# Patient Record
Sex: MALE | Race: WHITE | NOT HISPANIC OR LATINO | URBAN - METROPOLITAN AREA
[De-identification: names, ages, dates, MRNs, and addresses within clinical notes are randomized per-mention and may not be internally consistent; named-entity substitution may affect disease eponyms.]

---

## 2017-04-15 ENCOUNTER — EMERGENCY (EMERGENCY)
Facility: HOSPITAL | Age: 72
LOS: 1 days | Discharge: HOME | End: 2017-04-15
Attending: EMERGENCY MEDICINE | Admitting: EMERGENCY MEDICINE
Payer: MEDICARE

## 2017-04-15 VITALS
SYSTOLIC BLOOD PRESSURE: 147 MMHG | HEIGHT: 72 IN | HEART RATE: 59 BPM | OXYGEN SATURATION: 99 % | TEMPERATURE: 98 F | WEIGHT: 229.06 LBS | RESPIRATION RATE: 20 BRPM | DIASTOLIC BLOOD PRESSURE: 83 MMHG

## 2017-04-15 DIAGNOSIS — R31.9 HEMATURIA, UNSPECIFIED: ICD-10-CM

## 2017-04-15 DIAGNOSIS — I10 ESSENTIAL (PRIMARY) HYPERTENSION: ICD-10-CM

## 2017-04-15 DIAGNOSIS — E78.5 HYPERLIPIDEMIA, UNSPECIFIED: ICD-10-CM

## 2017-04-15 DIAGNOSIS — Z79.899 OTHER LONG TERM (CURRENT) DRUG THERAPY: ICD-10-CM

## 2017-04-15 LAB
ALBUMIN SERPL ELPH-MCNC: 3.7 G/DL — SIGNIFICANT CHANGE UP (ref 3.4–5)
ALP SERPL-CCNC: 61 U/L — SIGNIFICANT CHANGE UP (ref 40–120)
ALT FLD-CCNC: 25 U/L — SIGNIFICANT CHANGE UP (ref 12–42)
ANION GAP SERPL CALC-SCNC: 10 MMOL/L — SIGNIFICANT CHANGE UP (ref 9–16)
APTT BLD: 32 SEC — SIGNIFICANT CHANGE UP (ref 27.5–36.5)
AST SERPL-CCNC: 23 U/L — SIGNIFICANT CHANGE UP (ref 15–37)
BILIRUB SERPL-MCNC: 0.4 MG/DL — SIGNIFICANT CHANGE UP (ref 0.2–1.2)
BUN SERPL-MCNC: 25 MG/DL — HIGH (ref 7–23)
CALCIUM SERPL-MCNC: 9.3 MG/DL — SIGNIFICANT CHANGE UP (ref 8.5–10.5)
CHLORIDE SERPL-SCNC: 105 MMOL/L — SIGNIFICANT CHANGE UP (ref 96–108)
CO2 SERPL-SCNC: 24 MMOL/L — SIGNIFICANT CHANGE UP (ref 22–31)
CREAT SERPL-MCNC: 0.78 MG/DL — SIGNIFICANT CHANGE UP (ref 0.5–1.3)
GLUCOSE SERPL-MCNC: 94 MG/DL — SIGNIFICANT CHANGE UP (ref 70–99)
HCT VFR BLD CALC: 42.4 % — SIGNIFICANT CHANGE UP (ref 39–50)
HGB BLD-MCNC: 14.5 G/DL — SIGNIFICANT CHANGE UP (ref 13–17)
INR BLD: 1.06 — SIGNIFICANT CHANGE UP (ref 0.88–1.16)
MCHC RBC-ENTMCNC: 30.9 PG — SIGNIFICANT CHANGE UP (ref 27–34)
MCHC RBC-ENTMCNC: 34.2 G/DL — SIGNIFICANT CHANGE UP (ref 32–36)
MCV RBC AUTO: 90.4 FL — SIGNIFICANT CHANGE UP (ref 80–100)
PLATELET # BLD AUTO: 150 K/UL — SIGNIFICANT CHANGE UP (ref 150–400)
POTASSIUM SERPL-MCNC: 3.8 MMOL/L — SIGNIFICANT CHANGE UP (ref 3.5–5.3)
POTASSIUM SERPL-SCNC: 3.8 MMOL/L — SIGNIFICANT CHANGE UP (ref 3.5–5.3)
PROT SERPL-MCNC: 7.1 G/DL — SIGNIFICANT CHANGE UP (ref 6.4–8.2)
PROTHROM AB SERPL-ACNC: 11.7 SEC — SIGNIFICANT CHANGE UP (ref 9.8–12.7)
RBC # BLD: 4.69 M/UL — SIGNIFICANT CHANGE UP (ref 4.2–5.8)
RBC # FLD: 13.3 % — SIGNIFICANT CHANGE UP (ref 10.3–16.9)
SODIUM SERPL-SCNC: 139 MMOL/L — SIGNIFICANT CHANGE UP (ref 132–145)
WBC # BLD: 7.8 K/UL — SIGNIFICANT CHANGE UP (ref 3.8–10.5)
WBC # FLD AUTO: 7.8 K/UL — SIGNIFICANT CHANGE UP (ref 3.8–10.5)

## 2017-04-15 PROCEDURE — 99284 EMERGENCY DEPT VISIT MOD MDM: CPT

## 2017-04-15 RX ORDER — CEFOXITIN 1 G/1
1 INJECTION, POWDER, FOR SOLUTION INTRAVENOUS
Qty: 14 | Refills: 0 | OUTPATIENT
Start: 2017-04-15 | End: 2017-04-22

## 2017-04-15 RX ORDER — CEFUROXIME AXETIL 250 MG
500 TABLET ORAL EVERY 12 HOURS
Qty: 0 | Refills: 0 | Status: DISCONTINUED | OUTPATIENT
Start: 2017-04-15 | End: 2017-04-19

## 2017-04-15 RX ADMIN — Medication 500 MILLIGRAM(S): at 22:35

## 2017-04-15 NOTE — ED PROVIDER NOTE - NS ED ROS FT
Denies fevers, chills, nausea, vomiting, diarrhea, constipation, abdominal pain, chest pain, palpitations, shortness of breath, dyspnea on exertion, syncope/near syncope, cough/URI symptoms, headache, weakness, numbness, focal deficits

## 2017-04-15 NOTE — ED ADULT TRIAGE NOTE - CHIEF COMPLAINT QUOTE
Received patient as walkin with complaints of 'blood in the urine and burning on urination since this morning.' denies any pain, states this has happened before.

## 2017-04-15 NOTE — ED PROVIDER NOTE - OBJECTIVE STATEMENT
70 y/o M    PMHx: HTN, BPH, HLD    Pt presents with 2 days of bloody urine. Denies any pain- some mild irritation when urinating but no specific dysuria. Denies abd pain or flank pain. Denies fevers, chills, n/v/d/c. Pt does not take any blood thinners. states he has had similar sxs in past- and had a UTI. Sxs resolved with abx.

## 2017-04-15 NOTE — ED PROVIDER NOTE - MEDICAL DECISION MAKING DETAILS
Pt with painless hematurias. hx BPH and UTIs. Will cover with Ceftin. Pt aware he needs close follow up with Urology. basic labs WNL, normal H&H, asymptomatic. Vitals stable. Gven 1st dose of ceftin here.

## 2017-04-17 LAB
CULTURE RESULTS: NO GROWTH — SIGNIFICANT CHANGE UP
SPECIMEN SOURCE: SIGNIFICANT CHANGE UP

## 2017-04-20 PROBLEM — Z00.00 ENCOUNTER FOR PREVENTIVE HEALTH EXAMINATION: Status: ACTIVE | Noted: 2017-04-20

## 2017-04-26 ENCOUNTER — APPOINTMENT (OUTPATIENT)
Dept: UROLOGY | Facility: CLINIC | Age: 72
End: 2017-04-26

## 2017-04-26 VITALS
HEIGHT: 73 IN | SYSTOLIC BLOOD PRESSURE: 126 MMHG | HEART RATE: 61 BPM | WEIGHT: 229 LBS | BODY MASS INDEX: 30.35 KG/M2 | TEMPERATURE: 98.1 F | DIASTOLIC BLOOD PRESSURE: 75 MMHG

## 2017-04-26 DIAGNOSIS — Z86.39 PERSONAL HISTORY OF OTHER ENDOCRINE, NUTRITIONAL AND METABOLIC DISEASE: ICD-10-CM

## 2017-04-27 PROBLEM — Z86.39 HISTORY OF HYPERLIPIDEMIA: Status: RESOLVED | Noted: 2017-04-27 | Resolved: 2017-04-27

## 2017-04-27 RX ORDER — SIMVASTATIN 40 MG/1
TABLET, FILM COATED ORAL
Refills: 0 | Status: ACTIVE | COMMUNITY

## 2017-04-28 LAB — CORE LAB NON GYN CYTOLOGY TO LENOX HILL: NORMAL

## 2017-04-30 LAB — BACTERIA UR CULT: NORMAL

## 2017-05-15 ENCOUNTER — APPOINTMENT (OUTPATIENT)
Dept: UROLOGY | Facility: CLINIC | Age: 72
End: 2017-05-15

## 2017-05-15 VITALS
SYSTOLIC BLOOD PRESSURE: 133 MMHG | HEIGHT: 73 IN | HEART RATE: 58 BPM | DIASTOLIC BLOOD PRESSURE: 78 MMHG | WEIGHT: 229 LBS | TEMPERATURE: 98.4 F | BODY MASS INDEX: 30.35 KG/M2

## 2017-09-11 ENCOUNTER — APPOINTMENT (OUTPATIENT)
Dept: UROLOGY | Facility: CLINIC | Age: 72
End: 2017-09-11
Payer: MEDICARE

## 2017-09-11 VITALS
DIASTOLIC BLOOD PRESSURE: 70 MMHG | BODY MASS INDEX: 30.35 KG/M2 | SYSTOLIC BLOOD PRESSURE: 127 MMHG | HEART RATE: 60 BPM | TEMPERATURE: 97.6 F | WEIGHT: 229 LBS | HEIGHT: 73 IN

## 2017-09-11 PROCEDURE — 99213 OFFICE O/P EST LOW 20 MIN: CPT

## 2017-09-12 LAB
APPEARANCE: ABNORMAL
BACTERIA: NEGATIVE
BILIRUBIN URINE: NEGATIVE
BLOOD URINE: NEGATIVE
COLOR: YELLOW
GLUCOSE QUALITATIVE U: NORMAL MG/DL
KETONES URINE: NEGATIVE
LEUKOCYTE ESTERASE URINE: NEGATIVE
MICROSCOPIC-UA: NORMAL
NITRITE URINE: NEGATIVE
PH URINE: 5.5
PROTEIN URINE: NEGATIVE MG/DL
RED BLOOD CELLS URINE: 2 /HPF
SPECIFIC GRAVITY URINE: 1.02
SQUAMOUS EPITHELIAL CELLS: 1 /HPF
UROBILINOGEN URINE: 1 MG/DL
WHITE BLOOD CELLS URINE: 1 /HPF

## 2017-09-13 LAB
BACTERIA UR CULT: NORMAL
CORE LAB NON GYN CYTOLOGY TO LENOX HILL: NORMAL

## 2017-10-12 ENCOUNTER — TRANSCRIPTION ENCOUNTER (OUTPATIENT)
Age: 72
End: 2017-10-12

## 2017-10-18 ENCOUNTER — APPOINTMENT (OUTPATIENT)
Dept: UROLOGY | Facility: CLINIC | Age: 72
End: 2017-10-18
Payer: MEDICARE

## 2017-10-18 VITALS — DIASTOLIC BLOOD PRESSURE: 62 MMHG | HEART RATE: 58 BPM | SYSTOLIC BLOOD PRESSURE: 112 MMHG | TEMPERATURE: 98 F

## 2017-10-18 PROCEDURE — 99213 OFFICE O/P EST LOW 20 MIN: CPT

## 2017-10-18 RX ORDER — TAMSULOSIN HYDROCHLORIDE 0.4 MG/1
CAPSULE ORAL
Refills: 0 | Status: DISCONTINUED | COMMUNITY
End: 2017-10-18

## 2018-03-11 ENCOUNTER — RX RENEWAL (OUTPATIENT)
Age: 73
End: 2018-03-11

## 2018-12-11 PROBLEM — R97.20 ELEVATED PROSTATE SPECIFIC ANTIGEN [PSA]: Chronic | Status: ACTIVE | Noted: 2017-04-15

## 2018-12-12 ENCOUNTER — MOBILE ON CALL (OUTPATIENT)
Age: 73
End: 2018-12-12

## 2018-12-12 ENCOUNTER — INPATIENT (INPATIENT)
Facility: HOSPITAL | Age: 73
LOS: 1 days | Discharge: HOME | DRG: 726 | End: 2018-12-14
Attending: UROLOGY | Admitting: UROLOGY
Payer: MEDICARE

## 2018-12-12 VITALS
DIASTOLIC BLOOD PRESSURE: 72 MMHG | OXYGEN SATURATION: 99 % | RESPIRATION RATE: 18 BRPM | HEART RATE: 65 BPM | SYSTOLIC BLOOD PRESSURE: 125 MMHG | TEMPERATURE: 97 F

## 2018-12-12 DIAGNOSIS — I10 ESSENTIAL (PRIMARY) HYPERTENSION: ICD-10-CM

## 2018-12-12 DIAGNOSIS — N40.0 BENIGN PROSTATIC HYPERPLASIA WITHOUT LOWER URINARY TRACT SYMPTOMS: ICD-10-CM

## 2018-12-12 DIAGNOSIS — N40.1 BENIGN PROSTATIC HYPERPLASIA WITH LOWER URINARY TRACT SYMPTOMS: ICD-10-CM

## 2018-12-12 DIAGNOSIS — N17.9 ACUTE KIDNEY FAILURE, UNSPECIFIED: ICD-10-CM

## 2018-12-12 LAB
ALBUMIN SERPL ELPH-MCNC: 2.8 G/DL — LOW (ref 3.4–5)
ALP SERPL-CCNC: 66 U/L — SIGNIFICANT CHANGE UP (ref 40–120)
ALT FLD-CCNC: 21 U/L — SIGNIFICANT CHANGE UP (ref 12–42)
ANION GAP SERPL CALC-SCNC: 16 MMOL/L — SIGNIFICANT CHANGE UP (ref 5–17)
ANION GAP SERPL CALC-SCNC: 16 MMOL/L — SIGNIFICANT CHANGE UP (ref 5–17)
ANION GAP SERPL CALC-SCNC: 17 MMOL/L — SIGNIFICANT CHANGE UP (ref 5–17)
ANION GAP SERPL CALC-SCNC: 19 MMOL/L — HIGH (ref 9–16)
APPEARANCE UR: ABNORMAL
APPEARANCE UR: CLEAR — SIGNIFICANT CHANGE UP
AST SERPL-CCNC: 17 U/L — SIGNIFICANT CHANGE UP (ref 15–37)
BASOPHILS NFR BLD AUTO: 0.5 % — SIGNIFICANT CHANGE UP (ref 0–2)
BILIRUB SERPL-MCNC: 0.4 MG/DL — SIGNIFICANT CHANGE UP (ref 0.2–1.2)
BILIRUB UR-MCNC: ABNORMAL
BILIRUB UR-MCNC: NEGATIVE — SIGNIFICANT CHANGE UP
BLD GP AB SCN SERPL QL: NEGATIVE — SIGNIFICANT CHANGE UP
BUN SERPL-MCNC: 121 MG/DL — HIGH (ref 7–23)
BUN SERPL-MCNC: 54 MG/DL — HIGH (ref 7–23)
BUN SERPL-MCNC: 69 MG/DL — HIGH (ref 7–23)
BUN SERPL-MCNC: >150 MG/DL — HIGH (ref 7–23)
CALCIUM SERPL-MCNC: 8.8 MG/DL — SIGNIFICANT CHANGE UP (ref 8.4–10.5)
CALCIUM SERPL-MCNC: 8.8 MG/DL — SIGNIFICANT CHANGE UP (ref 8.4–10.5)
CALCIUM SERPL-MCNC: 8.8 MG/DL — SIGNIFICANT CHANGE UP (ref 8.5–10.5)
CALCIUM SERPL-MCNC: 9.1 MG/DL — SIGNIFICANT CHANGE UP (ref 8.4–10.5)
CHLORIDE SERPL-SCNC: 102 MMOL/L — SIGNIFICANT CHANGE UP (ref 96–108)
CHLORIDE SERPL-SCNC: 106 MMOL/L — SIGNIFICANT CHANGE UP (ref 96–108)
CHLORIDE SERPL-SCNC: 108 MMOL/L — SIGNIFICANT CHANGE UP (ref 96–108)
CHLORIDE SERPL-SCNC: 113 MMOL/L — HIGH (ref 96–108)
CHLORIDE UR-SCNC: 33 MMOL/L — SIGNIFICANT CHANGE UP
CO2 SERPL-SCNC: 18 MMOL/L — LOW (ref 22–31)
CO2 SERPL-SCNC: 21 MMOL/L — LOW (ref 22–31)
CO2 SERPL-SCNC: 21 MMOL/L — LOW (ref 22–31)
CO2 SERPL-SCNC: 22 MMOL/L — SIGNIFICANT CHANGE UP (ref 22–31)
COLOR SPEC: ABNORMAL
COLOR SPEC: YELLOW — SIGNIFICANT CHANGE UP
CREAT ?TM UR-MCNC: 75 MG/DL — SIGNIFICANT CHANGE UP
CREAT ?TM UR-MCNC: 77 MG/DL — SIGNIFICANT CHANGE UP
CREAT SERPL-MCNC: 1.59 MG/DL — HIGH (ref 0.5–1.3)
CREAT SERPL-MCNC: 14.14 MG/DL — HIGH (ref 0.5–1.3)
CREAT SERPL-MCNC: 2.58 MG/DL — HIGH (ref 0.5–1.3)
CREAT SERPL-MCNC: 6.7 MG/DL — HIGH (ref 0.5–1.3)
DIFF PNL FLD: ABNORMAL
DIFF PNL FLD: ABNORMAL
EOSINOPHIL NFR BLD AUTO: 1.6 % — SIGNIFICANT CHANGE UP (ref 0–6)
GLUCOSE SERPL-MCNC: 109 MG/DL — HIGH (ref 70–99)
GLUCOSE SERPL-MCNC: 117 MG/DL — HIGH (ref 70–99)
GLUCOSE SERPL-MCNC: 134 MG/DL — HIGH (ref 70–99)
GLUCOSE SERPL-MCNC: 149 MG/DL — HIGH (ref 70–99)
GLUCOSE UR QL: NEGATIVE — SIGNIFICANT CHANGE UP
GLUCOSE UR QL: NEGATIVE — SIGNIFICANT CHANGE UP
HCT VFR BLD CALC: 37.2 % — LOW (ref 39–50)
HCT VFR BLD CALC: 37.7 % — LOW (ref 39–50)
HGB BLD-MCNC: 12.9 G/DL — LOW (ref 13–17)
HGB BLD-MCNC: 13.1 G/DL — SIGNIFICANT CHANGE UP (ref 13–17)
IMM GRANULOCYTES NFR BLD AUTO: 0.6 % — SIGNIFICANT CHANGE UP (ref 0–1.5)
KETONES UR-MCNC: ABNORMAL MG/DL
KETONES UR-MCNC: NEGATIVE — SIGNIFICANT CHANGE UP
LEUKOCYTE ESTERASE UR-ACNC: ABNORMAL
LEUKOCYTE ESTERASE UR-ACNC: ABNORMAL
LYMPHOCYTES # BLD AUTO: 22.7 % — SIGNIFICANT CHANGE UP (ref 13–44)
MAGNESIUM SERPL-MCNC: 2.7 MG/DL — HIGH (ref 1.6–2.6)
MCHC RBC-ENTMCNC: 30 PG — SIGNIFICANT CHANGE UP (ref 27–34)
MCHC RBC-ENTMCNC: 30.4 PG — SIGNIFICANT CHANGE UP (ref 27–34)
MCHC RBC-ENTMCNC: 34.7 G/DL — SIGNIFICANT CHANGE UP (ref 32–36)
MCHC RBC-ENTMCNC: 34.7 G/DL — SIGNIFICANT CHANGE UP (ref 32–36)
MCV RBC AUTO: 86.5 FL — SIGNIFICANT CHANGE UP (ref 80–100)
MCV RBC AUTO: 87.5 FL — SIGNIFICANT CHANGE UP (ref 80–100)
MONOCYTES NFR BLD AUTO: 13.9 % — SIGNIFICANT CHANGE UP (ref 2–14)
NEUTROPHILS NFR BLD AUTO: 60.7 % — SIGNIFICANT CHANGE UP (ref 43–77)
NITRITE UR-MCNC: NEGATIVE — SIGNIFICANT CHANGE UP
NITRITE UR-MCNC: POSITIVE
OSMOLALITY UR: 492 MOSMOL/KG — SIGNIFICANT CHANGE UP (ref 100–650)
PH UR: 5.5 — SIGNIFICANT CHANGE UP (ref 5–8)
PH UR: 6.5 — SIGNIFICANT CHANGE UP (ref 5–8)
PHOSPHATE SERPL-MCNC: 5.3 MG/DL — HIGH (ref 2.5–4.5)
PLATELET # BLD AUTO: 184 K/UL — SIGNIFICANT CHANGE UP (ref 150–400)
PLATELET # BLD AUTO: 214 K/UL — SIGNIFICANT CHANGE UP (ref 150–400)
POTASSIUM SERPL-MCNC: 3.9 MMOL/L — SIGNIFICANT CHANGE UP (ref 3.5–5.3)
POTASSIUM SERPL-MCNC: 4.2 MMOL/L — SIGNIFICANT CHANGE UP (ref 3.5–5.3)
POTASSIUM SERPL-MCNC: 4.2 MMOL/L — SIGNIFICANT CHANGE UP (ref 3.5–5.3)
POTASSIUM SERPL-MCNC: 4.9 MMOL/L — SIGNIFICANT CHANGE UP (ref 3.5–5.3)
POTASSIUM SERPL-SCNC: 3.9 MMOL/L — SIGNIFICANT CHANGE UP (ref 3.5–5.3)
POTASSIUM SERPL-SCNC: 4.2 MMOL/L — SIGNIFICANT CHANGE UP (ref 3.5–5.3)
POTASSIUM SERPL-SCNC: 4.2 MMOL/L — SIGNIFICANT CHANGE UP (ref 3.5–5.3)
POTASSIUM SERPL-SCNC: 4.9 MMOL/L — SIGNIFICANT CHANGE UP (ref 3.5–5.3)
POTASSIUM UR-SCNC: 18 MMOL/L — SIGNIFICANT CHANGE UP
PROT ?TM UR-MCNC: 20 MG/DL — HIGH (ref 0–12)
PROT SERPL-MCNC: 6.8 G/DL — SIGNIFICANT CHANGE UP (ref 6.4–8.2)
PROT UR-MCNC: >=300 MG/DL
PROT UR-MCNC: NEGATIVE MG/DL — SIGNIFICANT CHANGE UP
PROT/CREAT UR-RTO: 0.3 RATIO — HIGH (ref 0–0.2)
RBC # BLD: 4.25 M/UL — SIGNIFICANT CHANGE UP (ref 4.2–5.8)
RBC # BLD: 4.36 M/UL — SIGNIFICANT CHANGE UP (ref 4.2–5.8)
RBC # FLD: 13.3 % — SIGNIFICANT CHANGE UP (ref 10.3–14.5)
RBC # FLD: 13.3 % — SIGNIFICANT CHANGE UP (ref 10.3–16.9)
RH IG SCN BLD-IMP: NEGATIVE — SIGNIFICANT CHANGE UP
SODIUM SERPL-SCNC: 139 MMOL/L — SIGNIFICANT CHANGE UP (ref 132–145)
SODIUM SERPL-SCNC: 145 MMOL/L — SIGNIFICANT CHANGE UP (ref 135–145)
SODIUM SERPL-SCNC: 145 MMOL/L — SIGNIFICANT CHANGE UP (ref 135–145)
SODIUM SERPL-SCNC: 150 MMOL/L — HIGH (ref 135–145)
SODIUM UR-SCNC: 38 MMOL/L — SIGNIFICANT CHANGE UP
SP GR SPEC: 1.02 — SIGNIFICANT CHANGE UP (ref 1–1.03)
SP GR SPEC: 1.02 — SIGNIFICANT CHANGE UP (ref 1–1.03)
UROBILINOGEN FLD QL: 0.2 E.U./DL — SIGNIFICANT CHANGE UP
UROBILINOGEN FLD QL: 1 E.U./DL — SIGNIFICANT CHANGE UP
UUN UR-MCNC: 968 MG/DL — SIGNIFICANT CHANGE UP
WBC # BLD: 5.5 K/UL — SIGNIFICANT CHANGE UP (ref 3.8–10.5)
WBC # BLD: 8.6 K/UL — SIGNIFICANT CHANGE UP (ref 3.8–10.5)
WBC # FLD AUTO: 5.5 K/UL — SIGNIFICANT CHANGE UP (ref 3.8–10.5)
WBC # FLD AUTO: 8.6 K/UL — SIGNIFICANT CHANGE UP (ref 3.8–10.5)

## 2018-12-12 PROCEDURE — 74176 CT ABD & PELVIS W/O CONTRAST: CPT | Mod: 26

## 2018-12-12 PROCEDURE — 99223 1ST HOSP IP/OBS HIGH 75: CPT | Mod: GC

## 2018-12-12 PROCEDURE — 99285 EMERGENCY DEPT VISIT HI MDM: CPT

## 2018-12-12 PROCEDURE — 99223 1ST HOSP IP/OBS HIGH 75: CPT

## 2018-12-12 RX ORDER — DOCUSATE SODIUM 100 MG
100 CAPSULE ORAL
Qty: 0 | Refills: 0 | Status: DISCONTINUED | OUTPATIENT
Start: 2018-12-12 | End: 2018-12-14

## 2018-12-12 RX ORDER — TAMSULOSIN HYDROCHLORIDE 0.4 MG/1
0.8 CAPSULE ORAL AT BEDTIME
Qty: 0 | Refills: 0 | Status: DISCONTINUED | OUTPATIENT
Start: 2018-12-12 | End: 2018-12-14

## 2018-12-12 RX ORDER — CEPHALEXIN 500 MG
250 CAPSULE ORAL EVERY 6 HOURS
Qty: 0 | Refills: 0 | Status: DISCONTINUED | OUTPATIENT
Start: 2018-12-12 | End: 2018-12-14

## 2018-12-12 RX ORDER — BISOPROLOL FUMARATE 10 MG/1
2.5 TABLET, FILM COATED ORAL ONCE
Qty: 0 | Refills: 0 | Status: COMPLETED | OUTPATIENT
Start: 2018-12-12 | End: 2018-12-12

## 2018-12-12 RX ORDER — SODIUM CHLORIDE 9 MG/ML
500 INJECTION, SOLUTION INTRAVENOUS
Qty: 0 | Refills: 0 | Status: DISCONTINUED | OUTPATIENT
Start: 2018-12-12 | End: 2018-12-13

## 2018-12-12 RX ORDER — FINASTERIDE 5 MG/1
5 TABLET, FILM COATED ORAL DAILY
Qty: 0 | Refills: 0 | Status: DISCONTINUED | OUTPATIENT
Start: 2018-12-12 | End: 2018-12-14

## 2018-12-12 RX ORDER — ONDANSETRON 8 MG/1
4 TABLET, FILM COATED ORAL EVERY 6 HOURS
Qty: 0 | Refills: 0 | Status: DISCONTINUED | OUTPATIENT
Start: 2018-12-12 | End: 2018-12-14

## 2018-12-12 RX ORDER — OXYBUTYNIN CHLORIDE 5 MG
5 TABLET ORAL THREE TIMES A DAY
Qty: 0 | Refills: 0 | Status: DISCONTINUED | OUTPATIENT
Start: 2018-12-12 | End: 2018-12-14

## 2018-12-12 RX ORDER — CEPHALEXIN 500 MG
500 CAPSULE ORAL EVERY 12 HOURS
Qty: 0 | Refills: 0 | Status: DISCONTINUED | OUTPATIENT
Start: 2018-12-12 | End: 2018-12-12

## 2018-12-12 RX ORDER — CEFTRIAXONE 500 MG/1
1 INJECTION, POWDER, FOR SOLUTION INTRAMUSCULAR; INTRAVENOUS ONCE
Qty: 0 | Refills: 0 | Status: COMPLETED | OUTPATIENT
Start: 2018-12-12 | End: 2018-12-12

## 2018-12-12 RX ORDER — SODIUM CHLORIDE 9 MG/ML
1000 INJECTION, SOLUTION INTRAVENOUS
Qty: 0 | Refills: 0 | Status: DISCONTINUED | OUTPATIENT
Start: 2018-12-12 | End: 2018-12-13

## 2018-12-12 RX ORDER — SENNA PLUS 8.6 MG/1
2 TABLET ORAL AT BEDTIME
Qty: 0 | Refills: 0 | Status: DISCONTINUED | OUTPATIENT
Start: 2018-12-12 | End: 2018-12-14

## 2018-12-12 RX ORDER — SODIUM CHLORIDE 9 MG/ML
1000 INJECTION INTRAMUSCULAR; INTRAVENOUS; SUBCUTANEOUS
Qty: 0 | Refills: 0 | Status: DISCONTINUED | OUTPATIENT
Start: 2018-12-12 | End: 2018-12-12

## 2018-12-12 RX ORDER — BISOPROLOL FUMARATE 10 MG/1
2.5 TABLET, FILM COATED ORAL DAILY
Qty: 0 | Refills: 0 | Status: DISCONTINUED | OUTPATIENT
Start: 2018-12-12 | End: 2018-12-14

## 2018-12-12 RX ADMIN — CEFTRIAXONE 100 GRAM(S): 500 INJECTION, POWDER, FOR SOLUTION INTRAMUSCULAR; INTRAVENOUS at 06:04

## 2018-12-12 RX ADMIN — Medication 250 MILLIGRAM(S): at 14:52

## 2018-12-12 RX ADMIN — BISOPROLOL FUMARATE 2.5 MILLIGRAM(S): 10 TABLET, FILM COATED ORAL at 14:51

## 2018-12-12 RX ADMIN — Medication 100 MILLIGRAM(S): at 18:55

## 2018-12-12 RX ADMIN — SODIUM CHLORIDE 500 MILLILITER(S): 9 INJECTION, SOLUTION INTRAVENOUS at 16:01

## 2018-12-12 RX ADMIN — Medication 250 MILLIGRAM(S): at 19:11

## 2018-12-12 RX ADMIN — FINASTERIDE 5 MILLIGRAM(S): 5 TABLET, FILM COATED ORAL at 12:33

## 2018-12-12 RX ADMIN — TAMSULOSIN HYDROCHLORIDE 0.8 MILLIGRAM(S): 0.4 CAPSULE ORAL at 23:02

## 2018-12-12 RX ADMIN — Medication 250 MILLIGRAM(S): at 23:02

## 2018-12-12 RX ADMIN — SODIUM CHLORIDE 200 MILLILITER(S): 9 INJECTION, SOLUTION INTRAVENOUS at 16:01

## 2018-12-12 NOTE — CONSULT NOTE ADULT - ATTENDING COMMENTS
patient seen and examined  CODY from obstructive uropathy  rawls in place  copious urine output now  electrolytes in acceptable range, so no indication for dialysis at tis time  keep well hydrated as above  continue monitoring chem panel 2-3 x for today

## 2018-12-12 NOTE — ED PROVIDER NOTE - NS ED ROS FT
· CONSTITUTIONAL: no fever and no chills.  · CARDIOVASCULAR: normal rate and rhythm, no chest pain and no edema.  · RESPIRATORY: no chest pain, no cough, and no shortness of breath.  · GASTROINTESTINAL: +urinary retention, no abdominal pain, no bloating, no constipation, no diarrhea, no nausea and no vomiting.  · MUSCULOSKELETAL: no back pain, no musculoskeletal pain, no neck pain, and no weakness.  · SKIN: no abrasions, no jaundice, no lesions, no pruritis, and no rashes.  · NEURO: no loss of consciousness, no gait abnormality, no headache, no sensory deficits, and no weakness.  · PSYCHIATRIC: no known mental health issues.

## 2018-12-12 NOTE — ED PROVIDER NOTE - OBJECTIVE STATEMENT
73 year old female with h/o BPH presents with urinary retention x 3 days. reports was seeing urology outpatient, but has never had retention issues. no back pain. no fever. reports tension in bladder area.   Denies fever, chills, N/V/D/C, melena, hematochezia, hematuria, change in bowel function, dysuria, d/c, flank pain, HA, dizziness, focal weakness, CP, SOB, palpitations, cough, and malaise.

## 2018-12-12 NOTE — CONSULT NOTE ADULT - PROBLEM SELECTOR RECOMMENDATION 2
Patient's blood pressure ranging from 110 to 130's at present.  Low salt diet  Continue bisoprolol for now  No ACEi/ARB or diuretic at present.  Monitor BMP for now

## 2018-12-12 NOTE — CONSULT NOTE ADULT - PROBLEM SELECTOR RECOMMENDATION 3
BPH with obstructive uropathy due to prostate enlargement now with polyuric phase.    Plan:  Plan per urology team.  Continue rawls's catheter  Continue flomax and Finesteride for now.

## 2018-12-12 NOTE — H&P ADULT - NSHPPHYSICALEXAM_GEN_ALL_CORE
Gen: Pt well appearing, NAD, resting in bed comfortably  Abdomen: nondistended, soft, nontender  : Mtz in place draining urine  Neuro: A&Ox3 Gen: Pt well appearing, NAD, resting in bed comfortably  Abdomen: nondistended, soft, nontender  : Mtz in place draining dark urine, nml external male genitalia  Neuro: A&Ox3

## 2018-12-12 NOTE — H&P ADULT - PMH
Elevated PSA    HTN (hypertension) Benign prostatic hyperplasia with urinary retention    Elevated PSA    HTN (hypertension)

## 2018-12-12 NOTE — ED PROVIDER NOTE - PHYSICAL EXAMINATION
VITAL SIGNS: I have reviewed nursing notes and confirm.  CONSTITUTIONAL: Well-developed; well-nourished; in no acute distress.  SKIN: Skin is warm and dry, no acute rash.  HEAD: Normocephalic; atraumatic.  EYES: PERRL, EOM intact; conjunctiva and sclera clear.  ENT: No nasal discharge; airway clear.  NECK: Supple; non tender.  CARD: S1, S2 normal; no murmurs, gallops, or rubs. Regular rate and rhythm.  RESP: No wheezes, rales or rhonchi.  ABD: Normal bowel sounds; soft; non-distended; +suprapubic tenderness;  no palpable or pulsating mass; no hepatosplenomegaly.  EXT: Normal ROM. No clubbing, cyanosis or edema.  NEURO: Alert, oriented. Grossly unremarkable.  PSYCH: Cooperative, appropriate.

## 2018-12-12 NOTE — H&P ADULT - PROBLEM SELECTOR PLAN 1
-monitor UO, labs, replete (POD protocol)  -reg diet  -c/w rawls  -c/w flomax  -bowel regimen  -OOB, SCDs (dvt ppx)  -c/w IVF  -trend cr  -pain meds prn

## 2018-12-12 NOTE — CONSULT NOTE ADULT - ASSESSMENT
74 yo M pmh of HTN, HLD, BPH admitted from [facility] ER with complaint of 3 days urinary retention with improvement after placement of rawls's catheter. Nephrology consult called for CODY.

## 2018-12-12 NOTE — ED PROVIDER NOTE - PROGRESS NOTE DETAILS
rawls placed drained hematuria.  Cr noted at 14. patient to require admission. case discussed with Dr. Ramos who accepted the patient

## 2018-12-12 NOTE — CONSULT NOTE ADULT - PROBLEM SELECTOR RECOMMENDATION 9
Non oliguric CODY with markedly elevated BUN/creatinine due to obstructive uropathy due to severe prostate enlargement now with polyuric phase with 4.6 L urine output since today morning    Plan:  CT abdomen and pelvis without contrast reviewed.  Strict I/o   Monitor urine output  Patient received 1L of 0.45% NS since today morning along with 3 to 4 Liter oral fluids.  Patient receiving IV fluids bolus 500 cc at present followed by 150-200cc/hr of 1/2 NS at present with watch ful respiratory status. No prior cardiac history other than hypertension.   Monitor post obstructive diuresis phase along with electrolytes ( mag, phos, K, calcium every 6 to 8 hrly for now.  Replete electrolytes  IV fluid rate should be adjusted with goal return of fluid atleast 50-60% of his urine output per hour.  Avoid nephrotoxic agents  Continue rawls's catheter for now  Continue flomax and finesteride  Urology follow up Non oliguric CODY with markedly elevated BUN/creatinine due to obstructive uropathy due to severe prostate enlargement now with polyuric phase with 4.6 L urine output since today morning    Plan:  CT abdomen and pelvis without contrast reviewed.  Strict I/o   Urinalysis, Urine electrolytes, Urine cultures  Antibiotics per urology team with adjustment of dose per renal dose clearance.  Monitor urine output  Patient received 1L of 0.45% NS since today morning along with 3 to 4 Liter oral fluids.  Patient receiving IV fluids bolus 500 cc at present followed by 150-200cc/hr of 1/2 NS at present with watch ful respiratory status. No prior cardiac history other than hypertension.   Monitor post obstructive diuresis phase along with electrolytes ( mag, phos, K, calcium every 6 to 8 hrly for now.  Replete electrolytes  IV fluid rate should be adjusted with goal return of fluid atleast 50-60% of his urine output per hour.  Avoid nephrotoxic agents  Continue rawls's catheter for now  Continue flomax and finesteride  Urology follow up Non oliguric CODY with markedly elevated BUN/creatinine due to obstructive uropathy due to severe prostate enlargement now with polyuric phase with 4.6 L urine output since today morning    Plan:  CT abdomen and pelvis without contrast reviewed.  Strict I/o   Urinalysis, Urine electrolytes, Urine cultures  Antibiotics per urology team with adjustment of dose per renal dose clearance.  Monitor urine output  Patient received 1L of 0.45% NS since today morning along with 3 to 4 Liter oral fluids.  Patient receiving IV fluids bolus 500 cc at present followed by 150-200cc/hr of 1/2 NS at present with watch respiratory status. No prior cardiac history other than hypertension.   Monitor post obstructive diuresis phase along with electrolytes ( mag, phos, K, calcium every 6 to 8 hrly for now.  Replete electrolytes  IV fluid rate should be adjusted with goal return of fluid at least 50-60% of his urine output per hour.  Avoid nephrotoxic agents  Continue rawls's catheter for now  Continue flomax and finesteride  Urology follow up

## 2018-12-12 NOTE — H&P ADULT - HISTORY OF PRESENT ILLNESS
74 yo M pmh of HTN, HLD, BPH admitted from [facility] ER with complaint of 3 days urinary retention. Patient states approx 2 weeks ago he noticed blood in his urine, went to GP where he was prescribed Cipro, which he has since finished. Starting 3-4 days ago patient stopped being able to produce any urine. Has had previous bouts of retention in the past secondary to BPH. States he had abdominal discomfort, distension, and nausea that has stopped since rawls was placed in ER. In ER patient was noted to have Cr of 14 and was admitted. 72 yo M pmh of HTN, HLD, BPH admitted from [facility] ER with complaint of 3 days urinary retention. Patient states approx 2 weeks ago he noticed blood in his urine, went to GP where he was prescribed Cipro, which he has since finished. Starting 3-4 days ago patient stopped being able to produce any urine. Has had previous bouts of retention in the past secondary to BPH. States he had abdominal discomfort, distension, and nausea that has stopped since rawls was placed in ER. In ER patient was noted to have Cr of 14 and was admitted. Denies f/c, CP, SOB, trauma, vomitus, d/c, incontinence.

## 2018-12-12 NOTE — CONSULT NOTE ADULT - SUBJECTIVE AND OBJECTIVE BOX
Patient is a 73y old  Male who presents with a chief complaint of Urinary Retention x 3days (12 Dec 2018 09:48)      HPI:  74 yo M pmh of HTN, HLD, BPH admitted from [facility] ER with complaint of 3 days urinary retention. Patient states approx 2 weeks ago he noticed blood in his urine, went to GP where he was prescribed Cipro, which he has since finished. Starting 3-4 days ago patient stopped being able to produce any urine. Has had previous bouts of retention in the past secondary to BPH. States he had abdominal discomfort, distension, and nausea that has stopped since rawls was placed in ER. In ER patient was noted to have Cr of 14 and was admitted. Denies f/c, CP, SOB, trauma, vomitus, d/c, incontinence.    Patient denies any chest pain, shortness of breath, palpitations, dizziness, Syncope. He denies any confusion, headache, focal limb weakness, seizure like activity. He denies any fever, cough, chills, sore throat, malaise or other constitutional symptoms. He denies any flank or supra pubic pain at present after placement of rawls's catheter.    PAST MEDICAL & SURGICAL HISTORY:  Benign prostatic hyperplasia with urinary retention  Elevated PSA  HTN (hypertension)  No significant past surgical history      Allergies    No Known Allergies    Intolerances        FAMILY HISTORY:  Non contributory    SOCIAL HISTORY:  negative for smoking, ETOH, Drugs    MEDICATIONS  (STANDING):  bisoprolol   Tablet 2.5 milliGRAM(s) Oral daily  cephalexin 250 milliGRAM(s) Oral every 6 hours  docusate sodium 100 milliGRAM(s) Oral two times a day  finasteride 5 milliGRAM(s) Oral daily  sodium chloride 0.45%. 500 milliLiter(s) (500 mL/Hr) IV Continuous <Continuous>  sodium chloride 0.45%. 1000 milliLiter(s) (200 mL/Hr) IV Continuous <Continuous>  tamsulosin 0.8 milliGRAM(s) Oral at bedtime    MEDICATIONS  (PRN):  ondansetron Injectable 4 milliGRAM(s) IV Push every 6 hours PRN Nausea and/or Vomiting  oxybutynin 5 milliGRAM(s) Oral three times a day PRN Bladder spasms  senna 2 Tablet(s) Oral at bedtime PRN Constipation      REVIEW OF SYSTEMS:    CONSTITUTIONAL: Fatigue and tired, No fever or chills  EYES: No blurred or double vision.  ENT: No recent URI or sore throat  RESPIRATORY: No shortness of breath, cough, hemoptysis  CARDIOVASCULAR: No Chest pain or shortness of breath  GASTROINTESTINAL: No abdominal or flank pain, No nausea or vomiting, No diarrhea  GENITOURINARY: Difficulty passing urine, No dysuria or urinary burning, No hematuria  NEUROLOGICAL: No headaches or blurred vision  SKIN: No skin rashes   MUSCULOSKELETAL: No arthralgia, Joint pain, leg edema, No muscle pains        PHYSICAL EXAM:  GENERAL: NAD, well-developed, well nourished, alert, awake, no acute distress at present  HEAD:  Atraumatic, Normocephalic,   EYES: Bilateral conjunctival and sclera normal,  Oral cavity: Oral mucosa dry and pink  NECK: Neck supple, No JVD  CHEST/LUNG: Clear to auscultation bilaterally; No wheeze, no rales, no crepitations  HEART: Regular rate and rhythm. JADON II/VI at LPSB, No gallop, no rub   ABDOMEN: Soft, Nontender, non distended, bowel sounds present, No flank tenderness.   EXTREMITIES: No clubbing, cyanosis, or edema, no calf tenderness  Neurology: AAOx3, no focal neurological deficit  SKIN: No rashes or lesions      CAPILLARY BLOOD GLUCOSE          I&O's Summary    12 Dec 2018 07:01  -  12 Dec 2018 16:40  --------------------------------------------------------  IN: 3485 mL / OUT: 4600 mL / NET: -1115 mL          LABS:                                                   12-12-18 @ 11:13    145  |  108  |  121<H>  ----------------------------<  149<H>  3.9   |  21<L>  |  6.70<H>                                                 12-12-18 @ 05:56    139  |  102  |  >150<H>  ----------------------------<  117<H>  4.9   |  18<L>  |  14.14<H>    Ca    9.1      12 Dec 2018 11:13  Ca    8.8      12 Dec 2018 05:56  Phos  5.3     12-12  Mg     2.7     12-12    TPro  6.8  /  Alb  2.8<L>  /  TBili  0.4  /  DBili  x   /  AST  17  /  ALT  21  /  AlkPhos  66  12-12                          13.1   5.5   )-----------( 214      ( 12 Dec 2018 11:13 )             37.7     CBC Full  -  ( 12 Dec 2018 11:13 )  WBC Count : 5.5 K/uL  Hemoglobin : 13.1 g/dL  Hematocrit : 37.7 %  Platelet Count - Automated : 214 K/uL  Mean Cell Volume : 86.5 fL      CBC Full  -  ( 12 Dec 2018 05:56 )  WBC Count : 8.6 K/uL  Hemoglobin : 12.9 g/dL  Hematocrit : 37.2 %  Platelet Count - Automated : 184 K/uL  Mean Cell Volume : 87.5 fL              Urinalysis Basic - ( 12 Dec 2018 05:41 )    Color: x / Appearance: x / SG: x / pH: x  Gluc: x / Ketone: x  / Bili: x / Urobili: x   Blood: x / Protein: x / Nitrite: x   Leuk Esterase: x / RBC: Many /HPF / WBC > 10 /HPF   Sq Epi: x / Non Sq Epi: 0-5 /HPF / Bacteria: Many /HPF        RADIOLOGY & ADDITIONAL TESTS:  < from: CT Renal Stone [facility] (12.12.18 @ 06:29) >    [facility]  [facility]                           PROCEDURE DATE:  12/12/2018          INTERPRETATION:  CT of the ABDOMEN and PELVIS without intravenous   contrast dated 12/12/2018 6:29 AM    INDICATION: Flank pain    TECHNIQUE: CT of the abdomen and pelvis was performed. Intravenous and   oral contrast material were not administered in accordance with the renal   stone protocol. Axial, sagittal and coronal images and coronal MIP images   were produced and reviewed.    COMPARISON: CT abdomenpelvis dated 12/27/2016.    FINDINGS:    Evaluation of the abdominopelvic viscera is limited due to noncontrast   technique.    Lower chest: 5 mm solid nodule left lung base, stable.     Liver: Smooth in contour. No significant change in 5.5 cm hypodense   lesion right lobe of liver. A few scattered subcentimeter hypodensities   are too small to characterize.    Biliary system: Gallbladder is normal in size. No calcified gallstones.   No biliary ductal dilatation.    Pancreas: Unremarkable.    Spleen: Unremarkable.    Adrenal glands: Unremarkable.    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 There is   mild bilateral hydroureteronephrosis. No renal or ureteral stones are   seen.  Urinary Bladder: Rawls catheter present in acollapsed urinary bladder.   Bladder diverticulum is again seen.    Reproductive organs: Again, the prostate is massively enlarged and   protrudes into the posterior aspect of the urinary bladder.     Bowel/Peritoneum: Normal caliber without evidenceof obstruction. No   appreciable wall thickening. Normal appendix. No ascites or extraluminal   gas.     Lymph nodes: No lymphadenopathy.    Aorta/IVC: Normal caliber. Moderate atherosclerotic calcifications are   seen in the abdominal aorta and its major branches.  Abdominal wall: Bilateral fat-containing inguinal hernias are seen. Small   fat-containing umbilical hernia is noted.    Bones/Soft tissues: Moderate degenerative changes of the spine.       IMPRESSION: Mild bilateral hydroureteronephrosis without evidence of   renal or ureteral stones, possibly from bladder outlet obstruction due to   massively enlarged prostate.    Ancillary findings, as above.            "Thank you for the opportunity to participate in the care of this   patient."    STEFANY MARIE M.D., RADIOLOGY RESIDENT  This document has been electronically signed.  LYLY FELDMAN M.D., ATTENDING RADIOLOGIST  This document has been electronically signed.  Dec 12 2018  7:52AM                  < end of copied text >      EKG/Telemetry: Reviewed

## 2018-12-13 DIAGNOSIS — R33.9 RETENTION OF URINE, UNSPECIFIED: ICD-10-CM

## 2018-12-13 LAB
ANION GAP SERPL CALC-SCNC: 11 MMOL/L — SIGNIFICANT CHANGE UP (ref 5–17)
ANION GAP SERPL CALC-SCNC: 13 MMOL/L — SIGNIFICANT CHANGE UP (ref 5–17)
ANION GAP SERPL CALC-SCNC: 15 MMOL/L — SIGNIFICANT CHANGE UP (ref 5–17)
BASOPHILS NFR BLD AUTO: 0.3 % — SIGNIFICANT CHANGE UP (ref 0–2)
BUN SERPL-MCNC: 19 MG/DL — SIGNIFICANT CHANGE UP (ref 7–23)
BUN SERPL-MCNC: 25 MG/DL — HIGH (ref 7–23)
BUN SERPL-MCNC: 35 MG/DL — HIGH (ref 7–23)
CALCIUM SERPL-MCNC: 8.6 MG/DL — SIGNIFICANT CHANGE UP (ref 8.4–10.5)
CALCIUM SERPL-MCNC: 8.6 MG/DL — SIGNIFICANT CHANGE UP (ref 8.4–10.5)
CALCIUM SERPL-MCNC: 8.7 MG/DL — SIGNIFICANT CHANGE UP (ref 8.4–10.5)
CHLORIDE SERPL-SCNC: 106 MMOL/L — SIGNIFICANT CHANGE UP (ref 96–108)
CHLORIDE SERPL-SCNC: 110 MMOL/L — HIGH (ref 96–108)
CHLORIDE SERPL-SCNC: 113 MMOL/L — HIGH (ref 96–108)
CO2 SERPL-SCNC: 21 MMOL/L — LOW (ref 22–31)
CO2 SERPL-SCNC: 22 MMOL/L — SIGNIFICANT CHANGE UP (ref 22–31)
CO2 SERPL-SCNC: 23 MMOL/L — SIGNIFICANT CHANGE UP (ref 22–31)
CREAT SERPL-MCNC: 0.71 MG/DL — SIGNIFICANT CHANGE UP (ref 0.5–1.3)
CREAT SERPL-MCNC: 0.82 MG/DL — SIGNIFICANT CHANGE UP (ref 0.5–1.3)
CREAT SERPL-MCNC: 0.98 MG/DL — SIGNIFICANT CHANGE UP (ref 0.5–1.3)
CULTURE RESULTS: NO GROWTH — SIGNIFICANT CHANGE UP
EOSINOPHIL NFR BLD AUTO: 2.4 % — SIGNIFICANT CHANGE UP (ref 0–6)
GLUCOSE SERPL-MCNC: 110 MG/DL — HIGH (ref 70–99)
GLUCOSE SERPL-MCNC: 111 MG/DL — HIGH (ref 70–99)
GLUCOSE SERPL-MCNC: 128 MG/DL — HIGH (ref 70–99)
HCT VFR BLD CALC: 34.8 % — LOW (ref 39–50)
HGB BLD-MCNC: 12 G/DL — LOW (ref 13–17)
LYMPHOCYTES # BLD AUTO: 30.3 % — SIGNIFICANT CHANGE UP (ref 13–44)
MAGNESIUM SERPL-MCNC: 2.3 MG/DL — SIGNIFICANT CHANGE UP (ref 1.6–2.6)
MCHC RBC-ENTMCNC: 30.8 PG — SIGNIFICANT CHANGE UP (ref 27–34)
MCHC RBC-ENTMCNC: 34.5 G/DL — SIGNIFICANT CHANGE UP (ref 32–36)
MCV RBC AUTO: 89.2 FL — SIGNIFICANT CHANGE UP (ref 80–100)
MONOCYTES NFR BLD AUTO: 18.4 % — HIGH (ref 2–14)
NEUTROPHILS NFR BLD AUTO: 48.6 % — SIGNIFICANT CHANGE UP (ref 43–77)
PHOSPHATE SERPL-MCNC: 2.3 MG/DL — LOW (ref 2.5–4.5)
PLATELET # BLD AUTO: 191 K/UL — SIGNIFICANT CHANGE UP (ref 150–400)
POTASSIUM SERPL-MCNC: 3.9 MMOL/L — SIGNIFICANT CHANGE UP (ref 3.5–5.3)
POTASSIUM SERPL-MCNC: 4.1 MMOL/L — SIGNIFICANT CHANGE UP (ref 3.5–5.3)
POTASSIUM SERPL-MCNC: 4.1 MMOL/L — SIGNIFICANT CHANGE UP (ref 3.5–5.3)
POTASSIUM SERPL-SCNC: 3.9 MMOL/L — SIGNIFICANT CHANGE UP (ref 3.5–5.3)
POTASSIUM SERPL-SCNC: 4.1 MMOL/L — SIGNIFICANT CHANGE UP (ref 3.5–5.3)
POTASSIUM SERPL-SCNC: 4.1 MMOL/L — SIGNIFICANT CHANGE UP (ref 3.5–5.3)
RBC # BLD: 3.9 M/UL — LOW (ref 4.2–5.8)
RBC # FLD: 13.7 % — SIGNIFICANT CHANGE UP (ref 10.3–16.9)
SODIUM SERPL-SCNC: 142 MMOL/L — SIGNIFICANT CHANGE UP (ref 135–145)
SODIUM SERPL-SCNC: 143 MMOL/L — SIGNIFICANT CHANGE UP (ref 135–145)
SODIUM SERPL-SCNC: 149 MMOL/L — HIGH (ref 135–145)
SPECIMEN SOURCE: SIGNIFICANT CHANGE UP
WBC # BLD: 6.1 K/UL — SIGNIFICANT CHANGE UP (ref 3.8–10.5)
WBC # FLD AUTO: 6.1 K/UL — SIGNIFICANT CHANGE UP (ref 3.8–10.5)

## 2018-12-13 PROCEDURE — 99232 SBSQ HOSP IP/OBS MODERATE 35: CPT | Mod: GC

## 2018-12-13 RX ORDER — SODIUM CHLORIDE 9 MG/ML
1000 INJECTION, SOLUTION INTRAVENOUS
Qty: 0 | Refills: 0 | Status: DISCONTINUED | OUTPATIENT
Start: 2018-12-13 | End: 2018-12-13

## 2018-12-13 RX ORDER — SODIUM CHLORIDE 9 MG/ML
500 INJECTION, SOLUTION INTRAVENOUS
Qty: 0 | Refills: 0 | Status: COMPLETED | OUTPATIENT
Start: 2018-12-13 | End: 2018-12-13

## 2018-12-13 RX ADMIN — Medication 100 MILLIGRAM(S): at 17:39

## 2018-12-13 RX ADMIN — SODIUM CHLORIDE 100 MILLILITER(S): 9 INJECTION, SOLUTION INTRAVENOUS at 11:20

## 2018-12-13 RX ADMIN — TAMSULOSIN HYDROCHLORIDE 0.8 MILLIGRAM(S): 0.4 CAPSULE ORAL at 23:12

## 2018-12-13 RX ADMIN — Medication 250 MILLIGRAM(S): at 23:12

## 2018-12-13 RX ADMIN — FINASTERIDE 5 MILLIGRAM(S): 5 TABLET, FILM COATED ORAL at 11:15

## 2018-12-13 RX ADMIN — BISOPROLOL FUMARATE 2.5 MILLIGRAM(S): 10 TABLET, FILM COATED ORAL at 05:29

## 2018-12-13 RX ADMIN — Medication 250 MILLIGRAM(S): at 17:39

## 2018-12-13 RX ADMIN — Medication 250 MILLIGRAM(S): at 11:15

## 2018-12-13 RX ADMIN — SODIUM CHLORIDE 200 MILLILITER(S): 9 INJECTION, SOLUTION INTRAVENOUS at 05:59

## 2018-12-13 RX ADMIN — Medication 250 MILLIGRAM(S): at 05:30

## 2018-12-13 RX ADMIN — Medication 100 MILLIGRAM(S): at 05:30

## 2018-12-14 ENCOUNTER — TRANSCRIPTION ENCOUNTER (OUTPATIENT)
Age: 73
End: 2018-12-14

## 2018-12-14 VITALS
TEMPERATURE: 99 F | HEART RATE: 75 BPM | DIASTOLIC BLOOD PRESSURE: 73 MMHG | OXYGEN SATURATION: 95 % | SYSTOLIC BLOOD PRESSURE: 125 MMHG | RESPIRATION RATE: 16 BRPM

## 2018-12-14 LAB
ANION GAP SERPL CALC-SCNC: 14 MMOL/L — SIGNIFICANT CHANGE UP (ref 5–17)
BASOPHILS NFR BLD AUTO: 0.5 % — SIGNIFICANT CHANGE UP (ref 0–2)
BUN SERPL-MCNC: 15 MG/DL — SIGNIFICANT CHANGE UP (ref 7–23)
CALCIUM SERPL-MCNC: 8.6 MG/DL — SIGNIFICANT CHANGE UP (ref 8.4–10.5)
CHLORIDE SERPL-SCNC: 105 MMOL/L — SIGNIFICANT CHANGE UP (ref 96–108)
CO2 SERPL-SCNC: 24 MMOL/L — SIGNIFICANT CHANGE UP (ref 22–31)
CREAT SERPL-MCNC: 0.77 MG/DL — SIGNIFICANT CHANGE UP (ref 0.5–1.3)
EOSINOPHIL NFR BLD AUTO: 1.7 % — SIGNIFICANT CHANGE UP (ref 0–6)
GLUCOSE SERPL-MCNC: 112 MG/DL — HIGH (ref 70–99)
HCT VFR BLD CALC: 36.9 % — LOW (ref 39–50)
HGB BLD-MCNC: 12.3 G/DL — LOW (ref 13–17)
LYMPHOCYTES # BLD AUTO: 27.8 % — SIGNIFICANT CHANGE UP (ref 13–44)
MAGNESIUM SERPL-MCNC: 2 MG/DL — SIGNIFICANT CHANGE UP (ref 1.6–2.6)
MCHC RBC-ENTMCNC: 30.3 PG — SIGNIFICANT CHANGE UP (ref 27–34)
MCHC RBC-ENTMCNC: 33.3 G/DL — SIGNIFICANT CHANGE UP (ref 32–36)
MCV RBC AUTO: 90.9 FL — SIGNIFICANT CHANGE UP (ref 80–100)
MONOCYTES NFR BLD AUTO: 16.1 % — HIGH (ref 2–14)
NEUTROPHILS NFR BLD AUTO: 53.9 % — SIGNIFICANT CHANGE UP (ref 43–77)
PHOSPHATE SERPL-MCNC: 2.2 MG/DL — LOW (ref 2.5–4.5)
PLATELET # BLD AUTO: 204 K/UL — SIGNIFICANT CHANGE UP (ref 150–400)
POTASSIUM SERPL-MCNC: 4 MMOL/L — SIGNIFICANT CHANGE UP (ref 3.5–5.3)
POTASSIUM SERPL-SCNC: 4 MMOL/L — SIGNIFICANT CHANGE UP (ref 3.5–5.3)
RBC # BLD: 4.06 M/UL — LOW (ref 4.2–5.8)
RBC # FLD: 13.7 % — SIGNIFICANT CHANGE UP (ref 10.3–16.9)
SODIUM SERPL-SCNC: 143 MMOL/L — SIGNIFICANT CHANGE UP (ref 135–145)
WBC # BLD: 8.1 K/UL — SIGNIFICANT CHANGE UP (ref 3.8–10.5)
WBC # FLD AUTO: 8.1 K/UL — SIGNIFICANT CHANGE UP (ref 3.8–10.5)

## 2018-12-14 PROCEDURE — 74176 CT ABD & PELVIS W/O CONTRAST: CPT

## 2018-12-14 PROCEDURE — 81001 URINALYSIS AUTO W/SCOPE: CPT

## 2018-12-14 PROCEDURE — 84300 ASSAY OF URINE SODIUM: CPT

## 2018-12-14 PROCEDURE — 84100 ASSAY OF PHOSPHORUS: CPT

## 2018-12-14 PROCEDURE — 84540 ASSAY OF URINE/UREA-N: CPT

## 2018-12-14 PROCEDURE — 84156 ASSAY OF PROTEIN URINE: CPT

## 2018-12-14 PROCEDURE — 99232 SBSQ HOSP IP/OBS MODERATE 35: CPT | Mod: GC

## 2018-12-14 PROCEDURE — 80053 COMPREHEN METABOLIC PANEL: CPT

## 2018-12-14 PROCEDURE — 82436 ASSAY OF URINE CHLORIDE: CPT

## 2018-12-14 PROCEDURE — 96374 THER/PROPH/DIAG INJ IV PUSH: CPT | Mod: XU

## 2018-12-14 PROCEDURE — 86900 BLOOD TYPING SEROLOGIC ABO: CPT

## 2018-12-14 PROCEDURE — 99285 EMERGENCY DEPT VISIT HI MDM: CPT | Mod: 25

## 2018-12-14 PROCEDURE — 83935 ASSAY OF URINE OSMOLALITY: CPT

## 2018-12-14 PROCEDURE — 80048 BASIC METABOLIC PNL TOTAL CA: CPT

## 2018-12-14 PROCEDURE — 83735 ASSAY OF MAGNESIUM: CPT

## 2018-12-14 PROCEDURE — 87086 URINE CULTURE/COLONY COUNT: CPT

## 2018-12-14 PROCEDURE — 86901 BLOOD TYPING SEROLOGIC RH(D): CPT

## 2018-12-14 PROCEDURE — 51702 INSERT TEMP BLADDER CATH: CPT

## 2018-12-14 PROCEDURE — 82570 ASSAY OF URINE CREATININE: CPT

## 2018-12-14 PROCEDURE — 85027 COMPLETE CBC AUTOMATED: CPT

## 2018-12-14 PROCEDURE — 36415 COLL VENOUS BLD VENIPUNCTURE: CPT

## 2018-12-14 PROCEDURE — 84133 ASSAY OF URINE POTASSIUM: CPT

## 2018-12-14 PROCEDURE — 85025 COMPLETE CBC W/AUTO DIFF WBC: CPT

## 2018-12-14 PROCEDURE — 86850 RBC ANTIBODY SCREEN: CPT

## 2018-12-14 RX ORDER — SIMVASTATIN 20 MG/1
0 TABLET, FILM COATED ORAL
Qty: 0 | Refills: 0 | COMMUNITY

## 2018-12-14 RX ADMIN — FINASTERIDE 5 MILLIGRAM(S): 5 TABLET, FILM COATED ORAL at 11:47

## 2018-12-14 RX ADMIN — Medication 250 MILLIGRAM(S): at 11:47

## 2018-12-14 RX ADMIN — Medication 250 MILLIGRAM(S): at 06:23

## 2018-12-14 RX ADMIN — BISOPROLOL FUMARATE 2.5 MILLIGRAM(S): 10 TABLET, FILM COATED ORAL at 06:23

## 2018-12-14 RX ADMIN — Medication 100 MILLIGRAM(S): at 06:23

## 2018-12-14 NOTE — DISCHARGE NOTE ADULT - MEDICATION SUMMARY - MEDICATIONS TO TAKE
I will START or STAY ON the medications listed below when I get home from the hospital:    blood pressure pill  --     -- Indication: For Bisoprolol (home med)    Proscar 5 mg oral tablet  -- 1 tab(s) by mouth once a day  -- Indication: For Home med    Flomax 0.4 mg oral capsule  -- 1 cap(s) by mouth once a day  -- Indication: For Home med    simvastatin  --  by mouth   -- Indication: For Home med I will START or STAY ON the medications listed below when I get home from the hospital:    Proscar 5 mg oral tablet  -- 1 tab(s) by mouth once a day  -- Indication: For Home med    Flomax 0.4 mg oral capsule  -- 1 cap(s) by mouth once a day  -- Indication: For Home med

## 2018-12-14 NOTE — PROGRESS NOTE ADULT - ASSESSMENT
Patient is a 72 y/o male with hx of BPH with retention complicated with POD
Patient is a 74 y/o male with hx of BPH with retention, now in POD
74 yo man admitted with 3 days urinary retention and creat of 14.4-  rawls inserted with excellent improvement in renal function.  HTN, HLD, BPH   Creat normalized and now remains stable on oral hydration  Hypernatremia also resolved with dilute fluids  Nephrologically stable for discharge with outpatient f/u only at discretion of Dr. Cheung
74 yo man with HTN, HLD, BPH admitted with 3 days urinary retention and creat of 14.4-  rawls inserted with excellent improvement in renal function.  Creat normalized.  Na remains elevated.  Continue with reduction of IVF rate over next 12-24 hours-  needs to maintain even balance  so if continues a brisk urine output with  reduction in IVF rate may need to  raise rate-  encouraged more oral water intake  reviewed this AM with Dr. Cheung

## 2018-12-14 NOTE — DISCHARGE NOTE ADULT - PLAN OF CARE
return to normal, daily activities Follow up with Dr. Cheung on 12/31 as discussed.  You are being discharged with a rawls catheter, follow the instruction given on how to care and empty bag, monitoring for signs of infection.  If you have fever, uncontrollable pain or vomiting or any acute concerns please call your doctor or go to the ER.  Stay hydrated and ambulate without straining yourself.  Follow up with your GP as an outpatient as well. Follow up with Dr. Cheung on 12/31 as discussed.  You are being discharged with a rawls catheter, follow the instruction given on how to care and empty bag, monitoring for signs of infection.  If you have fever, uncontrollable pain or vomiting or any acute concerns please call your doctor or go to the ER.  Stay hydrated and ambulate without straining yourself.  Follow up with your GP as an outpatient as well as your surgeon and CONTINUE ALL HOME MEDICATIONS AS BEFORE

## 2018-12-14 NOTE — DISCHARGE NOTE ADULT - CARE PLAN
Principal Discharge DX:	CODY (acute kidney injury)  Goal:	return to normal, daily activities  Assessment and plan of treatment:	Follow up with Dr. Cheung on 12/31 as discussed.  You are being discharged with a rawls catheter, follow the instruction given on how to care and empty bag, monitoring for signs of infection.  If you have fever, uncontrollable pain or vomiting or any acute concerns please call your doctor or go to the ER.  Stay hydrated and ambulate without straining yourself.  Follow up with your GP as an outpatient as well.  Secondary Diagnosis:	Urinary retention Principal Discharge DX:	CODY (acute kidney injury)  Goal:	return to normal, daily activities  Assessment and plan of treatment:	Follow up with Dr. Cheung on 12/31 as discussed.  You are being discharged with a rawls catheter, follow the instruction given on how to care and empty bag, monitoring for signs of infection.  If you have fever, uncontrollable pain or vomiting or any acute concerns please call your doctor or go to the ER.  Stay hydrated and ambulate without straining yourself.  Follow up with your GP as an outpatient as well as your surgeon and CONTINUE ALL HOME MEDICATIONS AS BEFORE  Secondary Diagnosis:	Urinary retention

## 2018-12-14 NOTE — PROGRESS NOTE ADULT - PROBLEM SELECTOR PLAN 1
-rawls monitor uop for treatment of POD  -cont keflex  -pain meds prn  -renal diet  -oob, amb  -scds, IS  -cont present care.
-rawls monitor uop for treatment of POD  -cont keflex  -pain meds prn  -renal diet  -oob, amb  -scds, IS  -cont present care

## 2018-12-14 NOTE — DISCHARGE NOTE ADULT - CARE PROVIDER_API CALL
Zoltan Cheung (MD), Urology  [address], [place] [facility]  Phone: [phone]  Fax: [phone]

## 2018-12-14 NOTE — DISCHARGE NOTE ADULT - CONDITIONS AT DISCHARGE
rawls to leg bag stable, tolerated diet, demonstrated capacity to care for his rawls catheter to leg bag. supplies given (extra leg bag and long tubing drainage bag for night time use) for home use

## 2018-12-14 NOTE — PROGRESS NOTE ADULT - SUBJECTIVE AND OBJECTIVE BOX
AM Note    No acute events overnight.      Vital Signs Last 24 Hrs  T(C): 35.6 (12-13-18 @ 05:25), Max: 37.1 (12-12-18 @ 08:48)  T(F): 96 (12-13-18 @ 05:25), Max: 98.7 (12-12-18 @ 08:48)  HR: 86 (12-13-18 @ 05:25) (60 - 86)  BP: 125/77 (12-13-18 @ 05:25) (117/72 - 133/79)  BP(mean): --  RR: 17 (12-13-18 @ 05:25) (17 - 18)  SpO2: 96% (12-13-18 @ 05:25) (96% - 100%)     12 Dec 2018 22:41    150    |  113    |  54     ----------------------------<  109    4.2     |  21     |  1.59     Ca    8.8        12 Dec 2018 22:41  Phos  5.3       12 Dec 2018 11:13  Mg     2.7       12 Dec 2018 11:13    TPro  6.8    /  Alb  2.8    /  TBili  0.4    /  DBili  x      /  AST  17     /  ALT  21     /  AlkPhos  66     12 Dec 2018 05:56                          13.1   5.5   )-----------( 214      ( 12 Dec 2018 11:13 )             37.7         I&O's Summary    12 Dec 2018 07:01  -  13 Dec 2018 05:50  --------------------------------------------------------  IN: 7645 mL / OUT: 8275 mL / NET: -630 mL          PHYSICAL EXAM:    GEN: NAD  ABD: soft, ntnd  : rawls in place draining clear
Patient seen and examined at bedside.       T(C): , Max: 36.4 (18 @ 12:13)  T(F): , Max: 97.6 (18 @ 12:13)  HR: 90 (18 @ 16:02)  BP: 96/61 (18 @ 16:02)  BP(mean): --  RR: 17 (18 @ 16:02)  SpO2: 97% (18 @ 16:02)  Wt(kg): --     @ 07:01  -   @ 07:00  --------------------------------------------------------  IN:    dextrose 5%.: 400 mL    Oral Fluid: 3270 mL    sodium chloride 0.45%: 3000 mL    sodium chloride 0.45%: 500 mL    sodium chloride 0.9%: 875 mL  Total IN: 8045 mL    OUT:    Indwelling Catheter - Urethral: 8275 mL  Total OUT: 8275 mL    Total NET: -230 mL       @ 07:01  -   @ 17:10  --------------------------------------------------------  IN:    dextrose 5%.: 400 mL    Oral Fluid: 480 mL    sodium chloride 0.45%: 300 mL  Total IN: 1180 mL    OUT:    Indwelling Catheter - Urethral: 1800 mL  Total OUT: 1800 mL    Total NET: -620 mL            bisoprolol   Tablet 2.5 daily  cephalexin 250 every 6 hours  docusate sodium 100 two times a day  finasteride 5 daily  tamsulosin 0.8 at bedtime    Allergies    No Known Allergies    Intolerances      PHYSICAL EXAM:  Constitutional:  No acute distress   Back: No CVA tenderness  Respiratory: Clear to auscultation   Cardiovascular: S1, S2.  Regular rate and rhythm.    Gastrointestinal: soft, non-tender  Vasc/Extremities:  No lower extremity edema.    Skin: Warm. Dry.    Psychiatric: Normal affect.        LABS:                        12.0   6.1   )-----------( 191      ( 13 Dec 2018 05:46 )             34.8         142  |  110<H>  |  25<H>  ----------------------------<  128<H>  3.9   |  21<L>  |  0.82    Ca    8.6      13 Dec 2018 12:24  Phos  2.3       Mg     2.3         TPro  6.8  /  Alb  2.8<L>  /  TBili  0.4  /  DBili  x   /  AST  17  /  ALT  21  /  AlkPhos  66          Urinalysis Basic - ( 12 Dec 2018 17:22 )    Color: Yellow / Appearance: Clear / S.020 / pH: x  Gluc: x / Ketone: NEGATIVE  / Bili: Negative / Urobili: 0.2 E.U./dL   Blood: x / Protein: NEGATIVE mg/dL / Nitrite: NEGATIVE   Leuk Esterase: Trace / RBC: > 10 /HPF / WBC 5-10 /HPF   Sq Epi: x / Non Sq Epi: 0-5 /HPF / Bacteria: Present /HPF      Chloride, Random Urine: 33 mmol/L ( @ 17:22)  Creatinine, Random Urine: 75 mg/dL ( @ 17:22)  Potassium, Random Urine: 18 mmol/L ( @ 17:22)  Osmolality, Random Urine: 492 mosmol/kg ( @ 17:20)  Sodium, Random Urine: 38 mmol/L ( @ 17:19)  Creatinine, Random Urine: 77 mg/dL ( @ 17:19)  Protein/Creatinine Ratio Calculation: 0.3 Ratio ( @ 17:19)        RADIOLOGY & ADDITIONAL STUDIES:
Patient seen and examined at bedside.   feeling much better  Has elected to cancel his trip to [place] and proceed with prostatectomy.  Urine blood tinged  volume excellent        T(C): , Max: 37.5 (18 @ 20:59)  T(F): , Max: 99.5 (18 @ 20:59)  HR: 75 (18 @ 13:28)  BP: 125/73 (18 @ 13:28)  BP(mean): --  RR: 16 (18 @ 13:28)  SpO2: 95% (18 @ 13:28)  Wt(kg): --     @ 07:01  -   @ 07:00  --------------------------------------------------------  IN:    dextrose 5%.: 400 mL    Oral Fluid: 680 mL    sodium chloride 0.45%: 300 mL  Total IN: 1380 mL    OUT:    Indwelling Catheter - Urethral: 3725 mL  Total OUT: 3725 mL    Total NET: -2345 mL       @ 07:01  -   @ 15:26  --------------------------------------------------------  IN:    Oral Fluid: 580 mL  Total IN: 580 mL    OUT:    Indwelling Catheter - Urethral: 400 mL  Total OUT: 400 mL    Total NET: 180 mL            bisoprolol   Tablet 2.5 daily  cephalexin 250 every 6 hours  docusate sodium 100 two times a day  finasteride 5 daily  tamsulosin 0.8 at bedtime    Allergies    No Known Allergies    Intolerances      PHYSICAL EXAM:  Constitutional:  No acute distress  Back: No CVA tenderness  Respiratory: Clear to auscultation   Cardiovascular: S1, S2.  Regular rate and rhythm.    Gastrointestinal: soft, non-tender  Vasc/Extremities:  No lower extremity edema.    Neurological: No focal deficits.  Skin: Warm. Dry.    Psychiatric: Normal affect.        LABS:                        12.3   8.1   )-----------( 204      ( 14 Dec 2018 06:11 )             36.9     12-14    143  |  105  |  15  ----------------------------<  112<H>  4.0   |  24  |  0.77    Ca    8.6      14 Dec 2018 06:11  Phos  2.2     12-  Mg     2.0     -          Urinalysis Basic - ( 12 Dec 2018 17:22 )    Color: Yellow / Appearance: Clear / S.020 / pH: x  Gluc: x / Ketone: NEGATIVE  / Bili: Negative / Urobili: 0.2 E.U./dL   Blood: x / Protein: NEGATIVE mg/dL / Nitrite: NEGATIVE   Leuk Esterase: Trace / RBC: > 10 /HPF / WBC 5-10 /HPF   Sq Epi: x / Non Sq Epi: 0-5 /HPF / Bacteria: Present /HPF      Chloride, Random Urine: 33 mmol/L ( @ 17:22)  Creatinine, Random Urine: 75 mg/dL ( @ 17:22)  Potassium, Random Urine: 18 mmol/L ( @ 17:22)  Osmolality, Random Urine: 492 mosmol/kg ( @ 17:20)  Sodium, Random Urine: 38 mmol/L ( @ 17:19)  Creatinine, Random Urine: 77 mg/dL ( @ 17:19)  Protein/Creatinine Ratio Calculation: 0.3 Ratio ( @ 17:19)        RADIOLOGY & ADDITIONAL STUDIES:
 AM Note    No acute events overnight.      Vital Signs Last 24 Hrs  T(C): 37.5 (12-13-18 @ 20:59), Max: 37.5 (12-13-18 @ 20:59)  T(F): 99.5 (12-13-18 @ 20:59), Max: 99.5 (12-13-18 @ 20:59)  HR: 94 (12-13-18 @ 20:59) (63 - 94)  BP: 121/78 (12-13-18 @ 20:59) (96/61 - 125/77)  BP(mean): --  RR: 17 (12-13-18 @ 20:59) (16 - 17)  SpO2: 95% (12-13-18 @ 20:59) (95% - 98%)     13 Dec 2018 16:55    143    |  106    |  19     ----------------------------<  111    4.1     |  22     |  0.71     Ca    8.7        13 Dec 2018 16:55  Phos  2.3       13 Dec 2018 05:45  Mg     2.3       13 Dec 2018 05:45    TPro  6.8    /  Alb  2.8    /  TBili  0.4    /  DBili  x      /  AST  17     /  ALT  21     /  AlkPhos  66     12 Dec 2018 05:56                          12.0   6.1   )-----------( 191      ( 13 Dec 2018 05:46 )             34.8         I&O's Summary    12 Dec 2018 07:01  -  13 Dec 2018 07:00  --------------------------------------------------------  IN: 8045 mL / OUT: 8275 mL / NET: -230 mL    13 Dec 2018 07:01  -  14 Dec 2018 05:18  --------------------------------------------------------  IN: 1380 mL / OUT: 3075 mL / NET: -1695 mL          PHYSICAL EXAM:    GEN: NAD  ABD: soft, ntnd  : rawls in place draining clear

## 2018-12-14 NOTE — PROGRESS NOTE ADULT - REASON FOR ADMISSION
Urinary Retention x 3days

## 2018-12-14 NOTE — DISCHARGE NOTE ADULT - HOSPITAL COURSE
73[facility].  Bladder decompression c/b POD tx w/IVF and repletion.  Pt consulted with renal team.  He is HDS with AVSS, tolerating PO, ambulating well and ready for dc with rawls catheter to leg bag for follow up as outpt on the 31st.

## 2018-12-14 NOTE — DISCHARGE NOTE ADULT - PATIENT PORTAL LINK FT
You can access the [facility] Patient Portal, offered by [facility], by registering with the following website: [email or web address]

## 2018-12-17 DIAGNOSIS — N17.9 ACUTE KIDNEY FAILURE, UNSPECIFIED: ICD-10-CM

## 2018-12-17 DIAGNOSIS — N40.1 BENIGN PROSTATIC HYPERPLASIA WITH LOWER URINARY TRACT SYMPTOMS: ICD-10-CM

## 2018-12-17 DIAGNOSIS — R33.8 OTHER RETENTION OF URINE: ICD-10-CM

## 2018-12-17 DIAGNOSIS — E87.0 HYPEROSMOLALITY AND HYPERNATREMIA: ICD-10-CM

## 2018-12-17 DIAGNOSIS — N13.9 OBSTRUCTIVE AND REFLUX UROPATHY, UNSPECIFIED: ICD-10-CM

## 2018-12-17 DIAGNOSIS — I10 ESSENTIAL (PRIMARY) HYPERTENSION: ICD-10-CM

## 2018-12-20 ENCOUNTER — APPOINTMENT (OUTPATIENT)
Dept: UROLOGY | Facility: CLINIC | Age: 73
End: 2018-12-20
Payer: MEDICARE

## 2018-12-20 VITALS
SYSTOLIC BLOOD PRESSURE: 144 MMHG | HEIGHT: 73 IN | HEART RATE: 62 BPM | DIASTOLIC BLOOD PRESSURE: 75 MMHG | TEMPERATURE: 98.4 F | WEIGHT: 229 LBS | BODY MASS INDEX: 30.35 KG/M2

## 2018-12-20 PROCEDURE — 99214 OFFICE O/P EST MOD 30 MIN: CPT

## 2018-12-31 ENCOUNTER — APPOINTMENT (OUTPATIENT)
Dept: UROLOGY | Facility: CLINIC | Age: 73
End: 2018-12-31

## 2019-01-10 ENCOUNTER — RESULT REVIEW (OUTPATIENT)
Age: 74
End: 2019-01-10

## 2019-01-10 ENCOUNTER — INPATIENT (INPATIENT)
Facility: HOSPITAL | Age: 74
LOS: 7 days | Discharge: HOME | DRG: 707 | End: 2019-01-18
Attending: UROLOGY | Admitting: UROLOGY
Payer: MEDICARE

## 2019-01-10 ENCOUNTER — APPOINTMENT (OUTPATIENT)
Dept: UROLOGY | Facility: HOSPITAL | Age: 74
End: 2019-01-10

## 2019-01-10 VITALS
WEIGHT: 206.57 LBS | HEIGHT: 73 IN | OXYGEN SATURATION: 99 % | TEMPERATURE: 98 F | RESPIRATION RATE: 16 BRPM | SYSTOLIC BLOOD PRESSURE: 135 MMHG | HEART RATE: 77 BPM | DIASTOLIC BLOOD PRESSURE: 79 MMHG

## 2019-01-10 DIAGNOSIS — N40.1 BENIGN PROSTATIC HYPERPLASIA WITH LOWER URINARY TRACT SYMPTOMS: ICD-10-CM

## 2019-01-10 DIAGNOSIS — Z98.890 OTHER SPECIFIED POSTPROCEDURAL STATES: Chronic | ICD-10-CM

## 2019-01-10 PROCEDURE — 55821 REMOVAL OF PROSTATE: CPT

## 2019-01-10 RX ORDER — DOCUSATE SODIUM 100 MG
100 CAPSULE ORAL THREE TIMES A DAY
Qty: 0 | Refills: 0 | Status: DISCONTINUED | OUTPATIENT
Start: 2019-01-10 | End: 2019-01-18

## 2019-01-10 RX ORDER — HYDROMORPHONE HYDROCHLORIDE 2 MG/ML
4 INJECTION INTRAMUSCULAR; INTRAVENOUS; SUBCUTANEOUS EVERY 4 HOURS
Qty: 0 | Refills: 0 | Status: DISCONTINUED | OUTPATIENT
Start: 2019-01-10 | End: 2019-01-14

## 2019-01-10 RX ORDER — ONDANSETRON 8 MG/1
4 TABLET, FILM COATED ORAL EVERY 6 HOURS
Qty: 0 | Refills: 0 | Status: DISCONTINUED | OUTPATIENT
Start: 2019-01-10 | End: 2019-01-18

## 2019-01-10 RX ORDER — FINASTERIDE 5 MG/1
1 TABLET, FILM COATED ORAL
Qty: 0 | Refills: 0 | COMMUNITY

## 2019-01-10 RX ORDER — CEFAZOLIN SODIUM 1 G
1000 VIAL (EA) INJECTION EVERY 8 HOURS
Qty: 0 | Refills: 0 | Status: COMPLETED | OUTPATIENT
Start: 2019-01-10 | End: 2019-01-11

## 2019-01-10 RX ORDER — ATROPA BELLADONNA AND OPIUM 16.2; 6 MG/1; MG/1
1 SUPPOSITORY RECTAL EVERY 8 HOURS
Qty: 0 | Refills: 0 | Status: DISCONTINUED | OUTPATIENT
Start: 2019-01-10 | End: 2019-01-14

## 2019-01-10 RX ORDER — CELECOXIB 200 MG/1
400 CAPSULE ORAL ONCE
Qty: 0 | Refills: 0 | Status: COMPLETED | OUTPATIENT
Start: 2019-01-10 | End: 2019-01-10

## 2019-01-10 RX ORDER — ACETAMINOPHEN 500 MG
1000 TABLET ORAL ONCE
Qty: 0 | Refills: 0 | Status: COMPLETED | OUTPATIENT
Start: 2019-01-10 | End: 2019-01-10

## 2019-01-10 RX ORDER — HYDROMORPHONE HYDROCHLORIDE 2 MG/ML
2 INJECTION INTRAMUSCULAR; INTRAVENOUS; SUBCUTANEOUS EVERY 4 HOURS
Qty: 0 | Refills: 0 | Status: DISCONTINUED | OUTPATIENT
Start: 2019-01-10 | End: 2019-01-14

## 2019-01-10 RX ORDER — HYDROMORPHONE HYDROCHLORIDE 2 MG/ML
0.5 INJECTION INTRAMUSCULAR; INTRAVENOUS; SUBCUTANEOUS ONCE
Qty: 0 | Refills: 0 | Status: DISCONTINUED | OUTPATIENT
Start: 2019-01-10 | End: 2019-01-10

## 2019-01-10 RX ORDER — TAMSULOSIN HYDROCHLORIDE 0.4 MG/1
1 CAPSULE ORAL
Qty: 0 | Refills: 0 | COMMUNITY

## 2019-01-10 RX ORDER — BUPIVACAINE 13.3 MG/ML
20 INJECTION, SUSPENSION, LIPOSOMAL INFILTRATION ONCE
Qty: 0 | Refills: 0 | Status: DISCONTINUED | OUTPATIENT
Start: 2019-01-10 | End: 2019-01-18

## 2019-01-10 RX ORDER — SODIUM CHLORIDE 9 MG/ML
1000 INJECTION, SOLUTION INTRAVENOUS
Qty: 0 | Refills: 0 | Status: DISCONTINUED | OUTPATIENT
Start: 2019-01-10 | End: 2019-01-13

## 2019-01-10 RX ORDER — MORPHINE SULFATE 50 MG/1
4 CAPSULE, EXTENDED RELEASE ORAL ONCE
Qty: 0 | Refills: 0 | Status: DISCONTINUED | OUTPATIENT
Start: 2019-01-10 | End: 2019-01-10

## 2019-01-10 RX ORDER — SENNA PLUS 8.6 MG/1
1 TABLET ORAL AT BEDTIME
Qty: 0 | Refills: 0 | Status: DISCONTINUED | OUTPATIENT
Start: 2019-01-10 | End: 2019-01-18

## 2019-01-10 RX ORDER — GABAPENTIN 400 MG/1
300 CAPSULE ORAL ONCE
Qty: 0 | Refills: 0 | Status: COMPLETED | OUTPATIENT
Start: 2019-01-10 | End: 2019-01-10

## 2019-01-10 RX ORDER — HEPARIN SODIUM 5000 [USP'U]/ML
5000 INJECTION INTRAVENOUS; SUBCUTANEOUS EVERY 8 HOURS
Qty: 0 | Refills: 0 | Status: DISCONTINUED | OUTPATIENT
Start: 2019-01-10 | End: 2019-01-18

## 2019-01-10 RX ORDER — HEPARIN SODIUM 5000 [USP'U]/ML
5000 INJECTION INTRAVENOUS; SUBCUTANEOUS ONCE
Qty: 0 | Refills: 0 | Status: COMPLETED | OUTPATIENT
Start: 2019-01-10 | End: 2019-01-10

## 2019-01-10 RX ORDER — OXYBUTYNIN CHLORIDE 5 MG
5 TABLET ORAL EVERY 8 HOURS
Qty: 0 | Refills: 0 | Status: DISCONTINUED | OUTPATIENT
Start: 2019-01-10 | End: 2019-01-14

## 2019-01-10 RX ORDER — ACETAMINOPHEN 500 MG
1000 TABLET ORAL EVERY 6 HOURS
Qty: 0 | Refills: 0 | Status: DISCONTINUED | OUTPATIENT
Start: 2019-01-10 | End: 2019-01-14

## 2019-01-10 RX ADMIN — CELECOXIB 400 MILLIGRAM(S): 200 CAPSULE ORAL at 15:18

## 2019-01-10 RX ADMIN — HYDROMORPHONE HYDROCHLORIDE 0.5 MILLIGRAM(S): 2 INJECTION INTRAMUSCULAR; INTRAVENOUS; SUBCUTANEOUS at 21:40

## 2019-01-10 RX ADMIN — ATROPA BELLADONNA AND OPIUM 1 SUPPOSITORY(S): 16.2; 6 SUPPOSITORY RECTAL at 21:30

## 2019-01-10 RX ADMIN — SENNA PLUS 1 TABLET(S): 8.6 TABLET ORAL at 22:28

## 2019-01-10 RX ADMIN — MORPHINE SULFATE 4 MILLIGRAM(S): 50 CAPSULE, EXTENDED RELEASE ORAL at 23:00

## 2019-01-10 RX ADMIN — Medication 1000 MILLIGRAM(S): at 23:00

## 2019-01-10 RX ADMIN — HEPARIN SODIUM 5000 UNIT(S): 5000 INJECTION INTRAVENOUS; SUBCUTANEOUS at 15:20

## 2019-01-10 RX ADMIN — Medication 1000 MILLIGRAM(S): at 12:56

## 2019-01-10 RX ADMIN — ATROPA BELLADONNA AND OPIUM 1 SUPPOSITORY(S): 16.2; 6 SUPPOSITORY RECTAL at 21:57

## 2019-01-10 RX ADMIN — Medication 100 MILLIGRAM(S): at 22:25

## 2019-01-10 RX ADMIN — HEPARIN SODIUM 5000 UNIT(S): 5000 INJECTION INTRAVENOUS; SUBCUTANEOUS at 22:27

## 2019-01-10 RX ADMIN — MORPHINE SULFATE 4 MILLIGRAM(S): 50 CAPSULE, EXTENDED RELEASE ORAL at 22:32

## 2019-01-10 RX ADMIN — Medication 5 MILLIGRAM(S): at 21:55

## 2019-01-10 RX ADMIN — SODIUM CHLORIDE 100 MILLILITER(S): 9 INJECTION, SOLUTION INTRAVENOUS at 22:24

## 2019-01-10 RX ADMIN — HYDROMORPHONE HYDROCHLORIDE 0.5 MILLIGRAM(S): 2 INJECTION INTRAMUSCULAR; INTRAVENOUS; SUBCUTANEOUS at 21:57

## 2019-01-10 RX ADMIN — Medication 100 MILLIGRAM(S): at 22:28

## 2019-01-10 RX ADMIN — ONDANSETRON 4 MILLIGRAM(S): 8 TABLET, FILM COATED ORAL at 20:30

## 2019-01-10 RX ADMIN — GABAPENTIN 300 MILLIGRAM(S): 400 CAPSULE ORAL at 15:28

## 2019-01-10 NOTE — PROGRESS NOTE ADULT - ASSESSMENT
Patient is a 73 year old male w/ BPH s/p Lap Suprapubic prostatectomy (01/10/2019).  Patient is HDS, afebrile.     Plan:  Diet: Clears  Pain control  Monitor ELA output  Monitor Mtz cath output  Cont Abx: Ancef  OOB/IS  Bowel Regimen

## 2019-01-10 NOTE — BRIEF OPERATIVE NOTE - PROCEDURE
<<-----Click on this checkbox to enter Procedure Laparoscopic prostatectomy  01/10/2019  suprapubic  Active  LEATHACA

## 2019-01-11 LAB
ANION GAP SERPL CALC-SCNC: 16 MMOL/L — SIGNIFICANT CHANGE UP (ref 5–17)
BASOPHILS NFR BLD AUTO: 0 % — SIGNIFICANT CHANGE UP (ref 0–2)
BUN SERPL-MCNC: 32 MG/DL — HIGH (ref 7–23)
CALCIUM SERPL-MCNC: 8.6 MG/DL — SIGNIFICANT CHANGE UP (ref 8.4–10.5)
CHLORIDE SERPL-SCNC: 99 MMOL/L — SIGNIFICANT CHANGE UP (ref 96–108)
CO2 SERPL-SCNC: 23 MMOL/L — SIGNIFICANT CHANGE UP (ref 22–31)
CREAT SERPL-MCNC: 0.9 MG/DL — SIGNIFICANT CHANGE UP (ref 0.5–1.3)
EOSINOPHIL NFR BLD AUTO: 0 % — SIGNIFICANT CHANGE UP (ref 0–6)
GLUCOSE SERPL-MCNC: 142 MG/DL — HIGH (ref 70–99)
HCT VFR BLD CALC: 31.8 % — LOW (ref 39–50)
HGB BLD-MCNC: 10.4 G/DL — LOW (ref 13–17)
LYMPHOCYTES # BLD AUTO: 14.8 % — SIGNIFICANT CHANGE UP (ref 13–44)
MAGNESIUM SERPL-MCNC: 1.8 MG/DL — SIGNIFICANT CHANGE UP (ref 1.6–2.6)
MCHC RBC-ENTMCNC: 29.4 PG — SIGNIFICANT CHANGE UP (ref 27–34)
MCHC RBC-ENTMCNC: 32.7 G/DL — SIGNIFICANT CHANGE UP (ref 32–36)
MCV RBC AUTO: 89.8 FL — SIGNIFICANT CHANGE UP (ref 80–100)
MONOCYTES NFR BLD AUTO: 8.9 % — SIGNIFICANT CHANGE UP (ref 2–14)
NEUTROPHILS NFR BLD AUTO: 76.3 % — SIGNIFICANT CHANGE UP (ref 43–77)
PHOSPHATE SERPL-MCNC: 4.6 MG/DL — HIGH (ref 2.5–4.5)
PLATELET # BLD AUTO: 154 K/UL — SIGNIFICANT CHANGE UP (ref 150–400)
POTASSIUM SERPL-MCNC: 4.9 MMOL/L — SIGNIFICANT CHANGE UP (ref 3.5–5.3)
POTASSIUM SERPL-SCNC: 4.9 MMOL/L — SIGNIFICANT CHANGE UP (ref 3.5–5.3)
RBC # BLD: 3.54 M/UL — LOW (ref 4.2–5.8)
RBC # FLD: 13.6 % — SIGNIFICANT CHANGE UP (ref 10.3–16.9)
SODIUM SERPL-SCNC: 138 MMOL/L — SIGNIFICANT CHANGE UP (ref 135–145)
WBC # BLD: 6.8 K/UL — SIGNIFICANT CHANGE UP (ref 3.8–10.5)
WBC # FLD AUTO: 6.8 K/UL — SIGNIFICANT CHANGE UP (ref 3.8–10.5)

## 2019-01-11 RX ORDER — TAMSULOSIN HYDROCHLORIDE 0.4 MG/1
0.4 CAPSULE ORAL AT BEDTIME
Qty: 0 | Refills: 0 | Status: DISCONTINUED | OUTPATIENT
Start: 2019-01-11 | End: 2019-01-15

## 2019-01-11 RX ORDER — ATORVASTATIN CALCIUM 80 MG/1
1 TABLET, FILM COATED ORAL
Qty: 0 | Refills: 0 | COMMUNITY

## 2019-01-11 RX ORDER — ATORVASTATIN CALCIUM 80 MG/1
20 TABLET, FILM COATED ORAL AT BEDTIME
Qty: 0 | Refills: 0 | Status: DISCONTINUED | OUTPATIENT
Start: 2019-01-11 | End: 2019-01-18

## 2019-01-11 RX ORDER — FINASTERIDE 5 MG/1
5 TABLET, FILM COATED ORAL DAILY
Qty: 0 | Refills: 0 | Status: DISCONTINUED | OUTPATIENT
Start: 2019-01-11 | End: 2019-01-18

## 2019-01-11 RX ORDER — MAGNESIUM SULFATE 500 MG/ML
1 VIAL (ML) INJECTION ONCE
Qty: 0 | Refills: 0 | Status: COMPLETED | OUTPATIENT
Start: 2019-01-11 | End: 2019-01-11

## 2019-01-11 RX ADMIN — SENNA PLUS 1 TABLET(S): 8.6 TABLET ORAL at 22:10

## 2019-01-11 RX ADMIN — Medication 5 MILLIGRAM(S): at 14:01

## 2019-01-11 RX ADMIN — HEPARIN SODIUM 5000 UNIT(S): 5000 INJECTION INTRAVENOUS; SUBCUTANEOUS at 14:01

## 2019-01-11 RX ADMIN — ATORVASTATIN CALCIUM 20 MILLIGRAM(S): 80 TABLET, FILM COATED ORAL at 22:09

## 2019-01-11 RX ADMIN — ATROPA BELLADONNA AND OPIUM 1 SUPPOSITORY(S): 16.2; 6 SUPPOSITORY RECTAL at 07:52

## 2019-01-11 RX ADMIN — HEPARIN SODIUM 5000 UNIT(S): 5000 INJECTION INTRAVENOUS; SUBCUTANEOUS at 22:11

## 2019-01-11 RX ADMIN — Medication 1000 MILLIGRAM(S): at 21:58

## 2019-01-11 RX ADMIN — Medication 100 MILLIGRAM(S): at 14:02

## 2019-01-11 RX ADMIN — Medication 100 MILLIGRAM(S): at 14:01

## 2019-01-11 RX ADMIN — FINASTERIDE 5 MILLIGRAM(S): 5 TABLET, FILM COATED ORAL at 14:10

## 2019-01-11 RX ADMIN — HYDROMORPHONE HYDROCHLORIDE 4 MILLIGRAM(S): 2 INJECTION INTRAMUSCULAR; INTRAVENOUS; SUBCUTANEOUS at 14:17

## 2019-01-11 RX ADMIN — TAMSULOSIN HYDROCHLORIDE 0.4 MILLIGRAM(S): 0.4 CAPSULE ORAL at 22:10

## 2019-01-11 RX ADMIN — HEPARIN SODIUM 5000 UNIT(S): 5000 INJECTION INTRAVENOUS; SUBCUTANEOUS at 06:21

## 2019-01-11 RX ADMIN — Medication 1000 MILLIGRAM(S): at 15:00

## 2019-01-11 RX ADMIN — HYDROMORPHONE HYDROCHLORIDE 2 MILLIGRAM(S): 2 INJECTION INTRAMUSCULAR; INTRAVENOUS; SUBCUTANEOUS at 02:53

## 2019-01-11 RX ADMIN — HYDROMORPHONE HYDROCHLORIDE 2 MILLIGRAM(S): 2 INJECTION INTRAMUSCULAR; INTRAVENOUS; SUBCUTANEOUS at 03:00

## 2019-01-11 RX ADMIN — Medication 1000 MILLIGRAM(S): at 06:18

## 2019-01-11 RX ADMIN — Medication 5 MILLIGRAM(S): at 06:18

## 2019-01-11 RX ADMIN — Medication 100 MILLIGRAM(S): at 06:18

## 2019-01-11 RX ADMIN — HYDROMORPHONE HYDROCHLORIDE 4 MILLIGRAM(S): 2 INJECTION INTRAMUSCULAR; INTRAVENOUS; SUBCUTANEOUS at 19:44

## 2019-01-11 RX ADMIN — Medication 1000 MILLIGRAM(S): at 07:00

## 2019-01-11 RX ADMIN — ATROPA BELLADONNA AND OPIUM 1 SUPPOSITORY(S): 16.2; 6 SUPPOSITORY RECTAL at 07:22

## 2019-01-11 RX ADMIN — Medication 5 MILLIGRAM(S): at 22:11

## 2019-01-11 RX ADMIN — Medication 100 MILLIGRAM(S): at 06:19

## 2019-01-11 RX ADMIN — Medication 100 GRAM(S): at 13:59

## 2019-01-11 RX ADMIN — Medication 1000 MILLIGRAM(S): at 21:28

## 2019-01-11 RX ADMIN — Medication 100 MILLIGRAM(S): at 22:10

## 2019-01-11 RX ADMIN — HYDROMORPHONE HYDROCHLORIDE 4 MILLIGRAM(S): 2 INJECTION INTRAMUSCULAR; INTRAVENOUS; SUBCUTANEOUS at 15:00

## 2019-01-11 RX ADMIN — HYDROMORPHONE HYDROCHLORIDE 4 MILLIGRAM(S): 2 INJECTION INTRAMUSCULAR; INTRAVENOUS; SUBCUTANEOUS at 19:12

## 2019-01-11 RX ADMIN — Medication 500 MILLIGRAM(S): at 14:00

## 2019-01-11 NOTE — PHYSICAL THERAPY INITIAL EVALUATION ADULT - ADDITIONAL COMMENTS
Patient lives in elevator apartment, no steps to enter. Patient denies home health assistance or history of falls. Patient reports only using a straight cane since he has had a catheter.

## 2019-01-12 LAB
ANION GAP SERPL CALC-SCNC: 9 MMOL/L — SIGNIFICANT CHANGE UP (ref 5–17)
BASOPHILS NFR BLD AUTO: 0.3 % — SIGNIFICANT CHANGE UP (ref 0–2)
BUN SERPL-MCNC: 20 MG/DL — SIGNIFICANT CHANGE UP (ref 7–23)
CALCIUM SERPL-MCNC: 8.3 MG/DL — LOW (ref 8.4–10.5)
CHLORIDE SERPL-SCNC: 104 MMOL/L — SIGNIFICANT CHANGE UP (ref 96–108)
CO2 SERPL-SCNC: 27 MMOL/L — SIGNIFICANT CHANGE UP (ref 22–31)
CREAT FLD-MCNC: 0.86 MG/DL — SIGNIFICANT CHANGE UP
CREAT SERPL-MCNC: 0.88 MG/DL — SIGNIFICANT CHANGE UP (ref 0.5–1.3)
EOSINOPHIL NFR BLD AUTO: 2.3 % — SIGNIFICANT CHANGE UP (ref 0–6)
GLUCOSE SERPL-MCNC: 103 MG/DL — HIGH (ref 70–99)
HCT VFR BLD CALC: 27.6 % — LOW (ref 39–50)
HGB BLD-MCNC: 9 G/DL — LOW (ref 13–17)
LYMPHOCYTES # BLD AUTO: 37.6 % — SIGNIFICANT CHANGE UP (ref 13–44)
MAGNESIUM SERPL-MCNC: 2 MG/DL — SIGNIFICANT CHANGE UP (ref 1.6–2.6)
MCHC RBC-ENTMCNC: 30 PG — SIGNIFICANT CHANGE UP (ref 27–34)
MCHC RBC-ENTMCNC: 32.6 G/DL — SIGNIFICANT CHANGE UP (ref 32–36)
MCV RBC AUTO: 92 FL — SIGNIFICANT CHANGE UP (ref 80–100)
MONOCYTES NFR BLD AUTO: 14.1 % — HIGH (ref 2–14)
NEUTROPHILS NFR BLD AUTO: 45.7 % — SIGNIFICANT CHANGE UP (ref 43–77)
PHOSPHATE SERPL-MCNC: 2.3 MG/DL — LOW (ref 2.5–4.5)
PLATELET # BLD AUTO: 135 K/UL — LOW (ref 150–400)
POTASSIUM SERPL-MCNC: 4 MMOL/L — SIGNIFICANT CHANGE UP (ref 3.5–5.3)
POTASSIUM SERPL-SCNC: 4 MMOL/L — SIGNIFICANT CHANGE UP (ref 3.5–5.3)
RBC # BLD: 3 M/UL — LOW (ref 4.2–5.8)
RBC # FLD: 13.5 % — SIGNIFICANT CHANGE UP (ref 10.3–16.9)
SODIUM SERPL-SCNC: 140 MMOL/L — SIGNIFICANT CHANGE UP (ref 135–145)
WBC # BLD: 6.1 K/UL — SIGNIFICANT CHANGE UP (ref 3.8–10.5)
WBC # FLD AUTO: 6.1 K/UL — SIGNIFICANT CHANGE UP (ref 3.8–10.5)

## 2019-01-12 RX ORDER — DIPHENHYDRAMINE HCL 50 MG
25 CAPSULE ORAL ONCE
Qty: 0 | Refills: 0 | Status: COMPLETED | OUTPATIENT
Start: 2019-01-12 | End: 2019-01-12

## 2019-01-12 RX ORDER — LIDOCAINE 4 G/100G
1 CREAM TOPICAL EVERY 6 HOURS
Qty: 0 | Refills: 0 | Status: DISCONTINUED | OUTPATIENT
Start: 2019-01-12 | End: 2019-01-18

## 2019-01-12 RX ADMIN — HEPARIN SODIUM 5000 UNIT(S): 5000 INJECTION INTRAVENOUS; SUBCUTANEOUS at 06:35

## 2019-01-12 RX ADMIN — ATROPA BELLADONNA AND OPIUM 1 SUPPOSITORY(S): 16.2; 6 SUPPOSITORY RECTAL at 16:56

## 2019-01-12 RX ADMIN — Medication 25 MILLIGRAM(S): at 21:31

## 2019-01-12 RX ADMIN — Medication 1000 MILLIGRAM(S): at 21:31

## 2019-01-12 RX ADMIN — Medication 5 MILLIGRAM(S): at 16:00

## 2019-01-12 RX ADMIN — Medication 100 MILLIGRAM(S): at 16:00

## 2019-01-12 RX ADMIN — Medication 1000 MILLIGRAM(S): at 22:14

## 2019-01-12 RX ADMIN — FINASTERIDE 5 MILLIGRAM(S): 5 TABLET, FILM COATED ORAL at 11:53

## 2019-01-12 RX ADMIN — HYDROMORPHONE HYDROCHLORIDE 4 MILLIGRAM(S): 2 INJECTION INTRAMUSCULAR; INTRAVENOUS; SUBCUTANEOUS at 16:00

## 2019-01-12 RX ADMIN — ATORVASTATIN CALCIUM 20 MILLIGRAM(S): 80 TABLET, FILM COATED ORAL at 21:31

## 2019-01-12 RX ADMIN — HYDROMORPHONE HYDROCHLORIDE 4 MILLIGRAM(S): 2 INJECTION INTRAMUSCULAR; INTRAVENOUS; SUBCUTANEOUS at 17:00

## 2019-01-12 RX ADMIN — Medication 100 MILLIGRAM(S): at 21:31

## 2019-01-12 RX ADMIN — Medication 1000 MILLIGRAM(S): at 10:50

## 2019-01-12 RX ADMIN — Medication 1000 MILLIGRAM(S): at 04:30

## 2019-01-12 RX ADMIN — SENNA PLUS 1 TABLET(S): 8.6 TABLET ORAL at 21:31

## 2019-01-12 RX ADMIN — Medication 100 MILLIGRAM(S): at 06:34

## 2019-01-12 RX ADMIN — Medication 1000 MILLIGRAM(S): at 09:51

## 2019-01-12 RX ADMIN — Medication 1000 MILLIGRAM(S): at 17:00

## 2019-01-12 RX ADMIN — HEPARIN SODIUM 5000 UNIT(S): 5000 INJECTION INTRAVENOUS; SUBCUTANEOUS at 21:31

## 2019-01-12 RX ADMIN — TAMSULOSIN HYDROCHLORIDE 0.4 MILLIGRAM(S): 0.4 CAPSULE ORAL at 21:31

## 2019-01-12 RX ADMIN — Medication 1000 MILLIGRAM(S): at 15:59

## 2019-01-12 RX ADMIN — Medication 5 MILLIGRAM(S): at 06:35

## 2019-01-12 RX ADMIN — Medication 1000 MILLIGRAM(S): at 05:00

## 2019-01-12 RX ADMIN — Medication 5 MILLIGRAM(S): at 22:14

## 2019-01-12 RX ADMIN — ATROPA BELLADONNA AND OPIUM 1 SUPPOSITORY(S): 16.2; 6 SUPPOSITORY RECTAL at 17:30

## 2019-01-12 RX ADMIN — HEPARIN SODIUM 5000 UNIT(S): 5000 INJECTION INTRAVENOUS; SUBCUTANEOUS at 16:00

## 2019-01-13 LAB
ANION GAP SERPL CALC-SCNC: 11 MMOL/L — SIGNIFICANT CHANGE UP (ref 5–17)
BASOPHILS NFR BLD AUTO: 0.3 % — SIGNIFICANT CHANGE UP (ref 0–2)
BUN SERPL-MCNC: 9 MG/DL — SIGNIFICANT CHANGE UP (ref 7–23)
CALCIUM SERPL-MCNC: 8.4 MG/DL — SIGNIFICANT CHANGE UP (ref 8.4–10.5)
CHLORIDE SERPL-SCNC: 103 MMOL/L — SIGNIFICANT CHANGE UP (ref 96–108)
CO2 SERPL-SCNC: 28 MMOL/L — SIGNIFICANT CHANGE UP (ref 22–31)
CREAT SERPL-MCNC: 0.63 MG/DL — SIGNIFICANT CHANGE UP (ref 0.5–1.3)
EOSINOPHIL NFR BLD AUTO: 3.1 % — SIGNIFICANT CHANGE UP (ref 0–6)
GLUCOSE SERPL-MCNC: 87 MG/DL — SIGNIFICANT CHANGE UP (ref 70–99)
HCT VFR BLD CALC: 28.7 % — LOW (ref 39–50)
HGB BLD-MCNC: 9.3 G/DL — LOW (ref 13–17)
LYMPHOCYTES # BLD AUTO: 29.1 % — SIGNIFICANT CHANGE UP (ref 13–44)
MAGNESIUM SERPL-MCNC: 1.9 MG/DL — SIGNIFICANT CHANGE UP (ref 1.6–2.6)
MCHC RBC-ENTMCNC: 29.3 PG — SIGNIFICANT CHANGE UP (ref 27–34)
MCHC RBC-ENTMCNC: 32.4 G/DL — SIGNIFICANT CHANGE UP (ref 32–36)
MCV RBC AUTO: 90.5 FL — SIGNIFICANT CHANGE UP (ref 80–100)
MONOCYTES NFR BLD AUTO: 10.2 % — SIGNIFICANT CHANGE UP (ref 2–14)
NEUTROPHILS NFR BLD AUTO: 57.3 % — SIGNIFICANT CHANGE UP (ref 43–77)
PHOSPHATE SERPL-MCNC: 2.1 MG/DL — LOW (ref 2.5–4.5)
PLATELET # BLD AUTO: 156 K/UL — SIGNIFICANT CHANGE UP (ref 150–400)
POTASSIUM SERPL-MCNC: 3.8 MMOL/L — SIGNIFICANT CHANGE UP (ref 3.5–5.3)
POTASSIUM SERPL-SCNC: 3.8 MMOL/L — SIGNIFICANT CHANGE UP (ref 3.5–5.3)
RBC # BLD: 3.17 M/UL — LOW (ref 4.2–5.8)
RBC # FLD: 14.2 % — SIGNIFICANT CHANGE UP (ref 10.3–16.9)
SODIUM SERPL-SCNC: 142 MMOL/L — SIGNIFICANT CHANGE UP (ref 135–145)
WBC # BLD: 7.1 K/UL — SIGNIFICANT CHANGE UP (ref 3.8–10.5)
WBC # FLD AUTO: 7.1 K/UL — SIGNIFICANT CHANGE UP (ref 3.8–10.5)

## 2019-01-13 RX ORDER — DIPHENHYDRAMINE HCL 50 MG
25 CAPSULE ORAL AT BEDTIME
Qty: 0 | Refills: 0 | Status: DISCONTINUED | OUTPATIENT
Start: 2019-01-13 | End: 2019-01-14

## 2019-01-13 RX ADMIN — FINASTERIDE 5 MILLIGRAM(S): 5 TABLET, FILM COATED ORAL at 12:50

## 2019-01-13 RX ADMIN — ATROPA BELLADONNA AND OPIUM 1 SUPPOSITORY(S): 16.2; 6 SUPPOSITORY RECTAL at 07:34

## 2019-01-13 RX ADMIN — ATORVASTATIN CALCIUM 20 MILLIGRAM(S): 80 TABLET, FILM COATED ORAL at 22:18

## 2019-01-13 RX ADMIN — Medication 100 MILLIGRAM(S): at 22:18

## 2019-01-13 RX ADMIN — Medication 1000 MILLIGRAM(S): at 22:18

## 2019-01-13 RX ADMIN — Medication 5 MILLIGRAM(S): at 07:00

## 2019-01-13 RX ADMIN — SENNA PLUS 1 TABLET(S): 8.6 TABLET ORAL at 22:18

## 2019-01-13 RX ADMIN — ATROPA BELLADONNA AND OPIUM 1 SUPPOSITORY(S): 16.2; 6 SUPPOSITORY RECTAL at 14:54

## 2019-01-13 RX ADMIN — Medication 1000 MILLIGRAM(S): at 09:41

## 2019-01-13 RX ADMIN — ATROPA BELLADONNA AND OPIUM 1 SUPPOSITORY(S): 16.2; 6 SUPPOSITORY RECTAL at 23:18

## 2019-01-13 RX ADMIN — HYDROMORPHONE HYDROCHLORIDE 4 MILLIGRAM(S): 2 INJECTION INTRAMUSCULAR; INTRAVENOUS; SUBCUTANEOUS at 10:30

## 2019-01-13 RX ADMIN — ATROPA BELLADONNA AND OPIUM 1 SUPPOSITORY(S): 16.2; 6 SUPPOSITORY RECTAL at 22:18

## 2019-01-13 RX ADMIN — HEPARIN SODIUM 5000 UNIT(S): 5000 INJECTION INTRAVENOUS; SUBCUTANEOUS at 07:00

## 2019-01-13 RX ADMIN — TAMSULOSIN HYDROCHLORIDE 0.4 MILLIGRAM(S): 0.4 CAPSULE ORAL at 22:18

## 2019-01-13 RX ADMIN — LIDOCAINE 1 APPLICATION(S): 4 CREAM TOPICAL at 09:41

## 2019-01-13 RX ADMIN — Medication 1000 MILLIGRAM(S): at 23:18

## 2019-01-13 RX ADMIN — Medication 100 MILLIGRAM(S): at 14:53

## 2019-01-13 RX ADMIN — ATROPA BELLADONNA AND OPIUM 1 SUPPOSITORY(S): 16.2; 6 SUPPOSITORY RECTAL at 07:00

## 2019-01-13 RX ADMIN — Medication 100 MILLIGRAM(S): at 07:00

## 2019-01-13 RX ADMIN — HYDROMORPHONE HYDROCHLORIDE 4 MILLIGRAM(S): 2 INJECTION INTRAMUSCULAR; INTRAVENOUS; SUBCUTANEOUS at 17:30

## 2019-01-13 RX ADMIN — ATROPA BELLADONNA AND OPIUM 1 SUPPOSITORY(S): 16.2; 6 SUPPOSITORY RECTAL at 15:40

## 2019-01-13 RX ADMIN — HEPARIN SODIUM 5000 UNIT(S): 5000 INJECTION INTRAVENOUS; SUBCUTANEOUS at 22:18

## 2019-01-13 RX ADMIN — HEPARIN SODIUM 5000 UNIT(S): 5000 INJECTION INTRAVENOUS; SUBCUTANEOUS at 14:53

## 2019-01-13 RX ADMIN — HYDROMORPHONE HYDROCHLORIDE 4 MILLIGRAM(S): 2 INJECTION INTRAMUSCULAR; INTRAVENOUS; SUBCUTANEOUS at 09:41

## 2019-01-13 RX ADMIN — Medication 1000 MILLIGRAM(S): at 10:30

## 2019-01-13 RX ADMIN — HYDROMORPHONE HYDROCHLORIDE 4 MILLIGRAM(S): 2 INJECTION INTRAMUSCULAR; INTRAVENOUS; SUBCUTANEOUS at 16:47

## 2019-01-13 RX ADMIN — Medication 5 MILLIGRAM(S): at 14:53

## 2019-01-13 RX ADMIN — Medication 25 MILLIGRAM(S): at 22:18

## 2019-01-13 RX ADMIN — Medication 5 MILLIGRAM(S): at 22:18

## 2019-01-13 RX ADMIN — Medication 1000 MILLIGRAM(S): at 16:28

## 2019-01-13 RX ADMIN — Medication 1000 MILLIGRAM(S): at 17:05

## 2019-01-13 NOTE — PROGRESS NOTE ADULT - PROBLEM SELECTOR PLAN 1
-I/O  -OOB, ambulate  -CLD  -ELA to bulb suction  -Titrate CBI -I/O  -OOB, ambulate  -Soft diet  -ELA to bulb suction  -Titrate CBI

## 2019-01-14 ENCOUNTER — TRANSCRIPTION ENCOUNTER (OUTPATIENT)
Age: 74
End: 2019-01-14

## 2019-01-14 LAB
ANION GAP SERPL CALC-SCNC: 12 MMOL/L — SIGNIFICANT CHANGE UP (ref 5–17)
BASOPHILS NFR BLD AUTO: 0.2 % — SIGNIFICANT CHANGE UP (ref 0–2)
BUN SERPL-MCNC: 8 MG/DL — SIGNIFICANT CHANGE UP (ref 7–23)
CALCIUM SERPL-MCNC: 8.6 MG/DL — SIGNIFICANT CHANGE UP (ref 8.4–10.5)
CHLORIDE SERPL-SCNC: 100 MMOL/L — SIGNIFICANT CHANGE UP (ref 96–108)
CO2 SERPL-SCNC: 29 MMOL/L — SIGNIFICANT CHANGE UP (ref 22–31)
CREAT SERPL-MCNC: 0.67 MG/DL — SIGNIFICANT CHANGE UP (ref 0.5–1.3)
EOSINOPHIL NFR BLD AUTO: 3.8 % — SIGNIFICANT CHANGE UP (ref 0–6)
GLUCOSE SERPL-MCNC: 97 MG/DL — SIGNIFICANT CHANGE UP (ref 70–99)
HCT VFR BLD CALC: 29.7 % — LOW (ref 39–50)
HGB BLD-MCNC: 9.4 G/DL — LOW (ref 13–17)
LYMPHOCYTES # BLD AUTO: 28.3 % — SIGNIFICANT CHANGE UP (ref 13–44)
MAGNESIUM SERPL-MCNC: 2 MG/DL — SIGNIFICANT CHANGE UP (ref 1.6–2.6)
MCHC RBC-ENTMCNC: 29.5 PG — SIGNIFICANT CHANGE UP (ref 27–34)
MCHC RBC-ENTMCNC: 31.6 G/DL — LOW (ref 32–36)
MCV RBC AUTO: 93.1 FL — SIGNIFICANT CHANGE UP (ref 80–100)
MONOCYTES NFR BLD AUTO: 9.7 % — SIGNIFICANT CHANGE UP (ref 2–14)
NEUTROPHILS NFR BLD AUTO: 58 % — SIGNIFICANT CHANGE UP (ref 43–77)
PHOSPHATE SERPL-MCNC: 2.7 MG/DL — SIGNIFICANT CHANGE UP (ref 2.5–4.5)
PLATELET # BLD AUTO: 175 K/UL — SIGNIFICANT CHANGE UP (ref 150–400)
POTASSIUM SERPL-MCNC: 3.9 MMOL/L — SIGNIFICANT CHANGE UP (ref 3.5–5.3)
POTASSIUM SERPL-SCNC: 3.9 MMOL/L — SIGNIFICANT CHANGE UP (ref 3.5–5.3)
RBC # BLD: 3.19 M/UL — LOW (ref 4.2–5.8)
RBC # FLD: 13.8 % — SIGNIFICANT CHANGE UP (ref 10.3–16.9)
SODIUM SERPL-SCNC: 141 MMOL/L — SIGNIFICANT CHANGE UP (ref 135–145)
WBC # BLD: 6.6 K/UL — SIGNIFICANT CHANGE UP (ref 3.8–10.5)
WBC # FLD AUTO: 6.6 K/UL — SIGNIFICANT CHANGE UP (ref 3.8–10.5)

## 2019-01-14 PROCEDURE — 72192 CT PELVIS W/O DYE: CPT | Mod: 26

## 2019-01-14 RX ORDER — OXYBUTYNIN CHLORIDE 5 MG
5 TABLET ORAL EVERY 8 HOURS
Qty: 0 | Refills: 0 | Status: DISCONTINUED | OUTPATIENT
Start: 2019-01-14 | End: 2019-01-14

## 2019-01-14 RX ORDER — POLYETHYLENE GLYCOL 3350 17 G/17G
17 POWDER, FOR SOLUTION ORAL ONCE
Qty: 0 | Refills: 0 | Status: COMPLETED | OUTPATIENT
Start: 2019-01-14 | End: 2019-01-14

## 2019-01-14 RX ORDER — DOCUSATE SODIUM 100 MG
1 CAPSULE ORAL
Qty: 18 | Refills: 0 | OUTPATIENT
Start: 2019-01-14 | End: 2019-01-22

## 2019-01-14 RX ORDER — ACETAMINOPHEN 500 MG
650 TABLET ORAL EVERY 6 HOURS
Qty: 0 | Refills: 0 | Status: DISCONTINUED | OUTPATIENT
Start: 2019-01-14 | End: 2019-01-18

## 2019-01-14 RX ORDER — OXYBUTYNIN CHLORIDE 5 MG
1 TABLET ORAL
Qty: 15 | Refills: 0 | OUTPATIENT
Start: 2019-01-14 | End: 2019-01-18

## 2019-01-14 RX ADMIN — Medication 1000 MILLIGRAM(S): at 09:52

## 2019-01-14 RX ADMIN — ATROPA BELLADONNA AND OPIUM 1 SUPPOSITORY(S): 16.2; 6 SUPPOSITORY RECTAL at 06:24

## 2019-01-14 RX ADMIN — Medication 1000 MILLIGRAM(S): at 04:39

## 2019-01-14 RX ADMIN — HEPARIN SODIUM 5000 UNIT(S): 5000 INJECTION INTRAVENOUS; SUBCUTANEOUS at 22:12

## 2019-01-14 RX ADMIN — POLYETHYLENE GLYCOL 3350 17 GRAM(S): 17 POWDER, FOR SOLUTION ORAL at 20:53

## 2019-01-14 RX ADMIN — FINASTERIDE 5 MILLIGRAM(S): 5 TABLET, FILM COATED ORAL at 13:39

## 2019-01-14 RX ADMIN — ATORVASTATIN CALCIUM 20 MILLIGRAM(S): 80 TABLET, FILM COATED ORAL at 22:06

## 2019-01-14 RX ADMIN — ONDANSETRON 4 MILLIGRAM(S): 8 TABLET, FILM COATED ORAL at 20:54

## 2019-01-14 RX ADMIN — Medication 100 MILLIGRAM(S): at 06:22

## 2019-01-14 RX ADMIN — Medication 1000 MILLIGRAM(S): at 10:30

## 2019-01-14 RX ADMIN — HEPARIN SODIUM 5000 UNIT(S): 5000 INJECTION INTRAVENOUS; SUBCUTANEOUS at 06:22

## 2019-01-14 RX ADMIN — Medication 100 MILLIGRAM(S): at 13:41

## 2019-01-14 RX ADMIN — HEPARIN SODIUM 5000 UNIT(S): 5000 INJECTION INTRAVENOUS; SUBCUTANEOUS at 13:41

## 2019-01-14 RX ADMIN — Medication 5 MILLIGRAM(S): at 06:22

## 2019-01-14 RX ADMIN — Medication 1000 MILLIGRAM(S): at 03:39

## 2019-01-14 NOTE — DISCHARGE NOTE ADULT - MEDICATION SUMMARY - MEDICATIONS TO STOP TAKING
I will STOP taking the medications listed below when I get home from the hospital:  None I will STOP taking the medications listed below when I get home from the hospital:    bisoprolol  -- 2.5 milligram(s) by mouth once a day

## 2019-01-14 NOTE — DISCHARGE NOTE ADULT - CARE PLAN
Principal Discharge DX:	Benign prostatic hyperplasia with urinary retention  Goal:	improvement after surgery  Assessment and plan of treatment:	regular diet, activity as tolerated. No heavy lifting or straining. Mtz to leg bag, Stay well hydrated. If fever >100.4 or any change or worsening of symptoms please call doctor or report to ED. Make follow up appointment with Dr Joyce. Principal Discharge DX:	Benign prostatic hyperplasia with urinary retention  Goal:	improvement after surgery  Assessment and plan of treatment:	regular diet, activity as tolerated. No heavy lifting or straining. Stay well hydrated. If fever >100.4 or any change or worsening of symptoms please call doctor or report to ED. Make follow up appointment with Dr Joyce. Please also make a followup appointment with Dr Campos in 1-2 weeks.

## 2019-01-14 NOTE — DISCHARGE NOTE ADULT - MEDICATION SUMMARY - MEDICATIONS TO TAKE
I will START or STAY ON the medications listed below when I get home from the hospital:    Proscar 5 mg oral tablet  -- 1 tab(s) by mouth once a day  -- Indication: For Home med    Percocet 5/325 oral tablet  -- 1 tab(s) by mouth every 6 hours, As Needed -for severe pain MDD:4   -- Caution federal law prohibits the transfer of this drug to any person other  than the person for whom it was prescribed.  May cause drowsiness.  Alcohol may intensify this effect.  Use care when operating dangerous machinery.  This prescription cannot be refilled.  This product contains acetaminophen.  Do not use  with any other product containing acetaminophen to prevent possible liver damage.  Using more of this medication than prescribed may cause serious breathing problems.    -- Indication: For for pain    Flomax 0.4 mg oral capsule  -- 1 cap(s) by mouth once a day  -- Indication: For Home med    atorvastatin 20 mg oral tablet  -- 1 tab(s) by mouth once a day  -- Indication: For Home med    bisoprolol  -- 2.5 milligram(s) by mouth once a day  -- Indication: For Home med    Colace 100 mg oral capsule  -- 1 cap(s) by mouth 2 times a day, As Needed -for constipation   -- Medication should be taken with plenty of water.    -- Indication: For for constipation    oxybutynin 5 mg oral tablet  -- 1 tab(s) by mouth 3 times a day, As Needed -for bladder spasms   -- May cause drowsiness.  Alcohol may intensify this effect.  Use care when operating dangerous machinery.    -- Indication: For for bladder spasms I will START or STAY ON the medications listed below when I get home from the hospital:    Proscar 5 mg oral tablet  -- 1 tab(s) by mouth once a day  -- Indication: For Home med    Percocet 5/325 oral tablet  -- 1 tab(s) by mouth every 6 hours, As Needed -for severe pain MDD:4   -- Caution federal law prohibits the transfer of this drug to any person other  than the person for whom it was prescribed.  May cause drowsiness.  Alcohol may intensify this effect.  Use care when operating dangerous machinery.  This prescription cannot be refilled.  This product contains acetaminophen.  Do not use  with any other product containing acetaminophen to prevent possible liver damage.  Using more of this medication than prescribed may cause serious breathing problems.    -- Indication: For as needed for pain    Flomax 0.4 mg oral capsule  -- 1 cap(s) by mouth once a day  -- Indication: For Home med    atorvastatin 20 mg oral tablet  -- 1 tab(s) by mouth once a day  -- Indication: For Home med    bisoprolol  -- 2.5 milligram(s) by mouth once a day  -- Indication: For Home med    Colace 100 mg oral capsule  -- 1 cap(s) by mouth 2 times a day, As Needed -for constipation   -- Medication should be taken with plenty of water.    -- Indication: For as needed for constipation    oxybutynin 5 mg oral tablet  -- 1 tab(s) by mouth 3 times a day, As Needed -for bladder spasms   -- May cause drowsiness.  Alcohol may intensify this effect.  Use care when operating dangerous machinery.    -- Indication: For as needed for bladder spasms I will START or STAY ON the medications listed below when I get home from the hospital:    Proscar 5 mg oral tablet  -- 1 tab(s) by mouth once a day  -- Indication: For Home med    Percocet 5/325 oral tablet  -- 1 tab(s) by mouth every 6 hours, As Needed -for severe pain MDD:4   -- Caution federal law prohibits the transfer of this drug to any person other  than the person for whom it was prescribed.  May cause drowsiness.  Alcohol may intensify this effect.  Use care when operating dangerous machinery.  This prescription cannot be refilled.  This product contains acetaminophen.  Do not use  with any other product containing acetaminophen to prevent possible liver damage.  Using more of this medication than prescribed may cause serious breathing problems.    -- Indication: For as needed for pain    Adult Aspirin 81 mg oral tablet, chewable  -- 1 tab(s) chewed once a day   -- Chew tablets before swallowing  Take with food or milk.    -- Indication: For atrial fibrillation     Flomax 0.4 mg oral capsule  -- 1 cap(s) by mouth once a day  -- Indication: For Home med    atorvastatin 20 mg oral tablet  -- 1 tab(s) by mouth once a day  -- Indication: For Home med    Toprol-XL 25 mg oral tablet, extended release  -- 0.5 tab(s) by mouth once a day   -- It is very important that you take or use this exactly as directed.  Do not skip doses or discontinue unless directed by your doctor.  May cause drowsiness.  Alcohol may intensify this effect.  Use care when operating dangerous machinery.  Some non-prescription drugs may aggravate your condition.  Read all labels carefully.  If a warning appears, check with your doctor before taking.  Swallow whole.  Do not crush.  Take with food or milk.  This drug may impair the ability to drive or operate machinery.  Use care until you become familiar with its effects.    -- Indication: For atrial fibrillation    Keflex 500 mg oral capsule  -- 1 cap(s) by mouth every 12 hours   -- Finish all this medication unless otherwise directed by prescriber.    -- Indication: For infection prophylaxis    Colace 100 mg oral capsule  -- 1 cap(s) by mouth 2 times a day, As Needed -for constipation   -- Medication should be taken with plenty of water.    -- Indication: For as needed for constipation    oxybutynin 5 mg oral tablet  -- 1 tab(s) by mouth 3 times a day, As Needed -for bladder spasms   -- May cause drowsiness.  Alcohol may intensify this effect.  Use care when operating dangerous machinery.    -- Indication: For as needed for bladder spasms

## 2019-01-14 NOTE — DISCHARGE NOTE ADULT - PATIENT PORTAL LINK FT
You can access the [facility] Patient Portal, offered by [facility], by registering with the following website: [email or web address]

## 2019-01-14 NOTE — DISCHARGE NOTE ADULT - HOSPITAL COURSE
73M hx htn, BPH underwent suprapubic prostatectomy 1/10. Tolerated procedure well. AVSS, afebrile and hemodynamically stable. 73M hx htn, BPH underwent suprapubic prostatectomy 1/10. Procedure c/b new-onset afib and postop ileus. Cardiology consulted- recommended lopressor and AC. Patient reverted back to NSR. Ileus now resolved. AVSS, afebrile and hemodynamically stable. 73M hx htn, BPH underwent suprapubic prostatectomy 1/10. Procedure c/b new-onset afib and postop ileus. Cardiology consulted- recommended lopressor and AC. Patient reverted back to NSR. NGT placed for Ileus, removed 1/16, now resolved. AVSS, afebrile and hemodynamically stable.

## 2019-01-14 NOTE — DISCHARGE NOTE ADULT - PLAN OF CARE
improvement after surgery regular diet, activity as tolerated. No heavy lifting or straining. Mtz to leg bag, Stay well hydrated. If fever >100.4 or any change or worsening of symptoms please call doctor or report to ED. Make follow up appointment with Dr Joyce. regular diet, activity as tolerated. No heavy lifting or straining. Stay well hydrated. If fever >100.4 or any change or worsening of symptoms please call doctor or report to ED. Make follow up appointment with Dr Joyce. Please also make a followup appointment with Dr Campos in 1-2 weeks.

## 2019-01-15 LAB
ANION GAP SERPL CALC-SCNC: 14 MMOL/L — SIGNIFICANT CHANGE UP (ref 5–17)
ANION GAP SERPL CALC-SCNC: 17 MMOL/L — SIGNIFICANT CHANGE UP (ref 5–17)
BASOPHILS NFR BLD AUTO: 0.1 % — SIGNIFICANT CHANGE UP (ref 0–2)
BASOPHILS NFR BLD AUTO: 0.1 % — SIGNIFICANT CHANGE UP (ref 0–2)
BUN SERPL-MCNC: 12 MG/DL — SIGNIFICANT CHANGE UP (ref 7–23)
BUN SERPL-MCNC: 17 MG/DL — SIGNIFICANT CHANGE UP (ref 7–23)
CALCIUM SERPL-MCNC: 8.7 MG/DL — SIGNIFICANT CHANGE UP (ref 8.4–10.5)
CALCIUM SERPL-MCNC: 8.7 MG/DL — SIGNIFICANT CHANGE UP (ref 8.4–10.5)
CHLORIDE SERPL-SCNC: 95 MMOL/L — LOW (ref 96–108)
CHLORIDE SERPL-SCNC: 96 MMOL/L — SIGNIFICANT CHANGE UP (ref 96–108)
CO2 SERPL-SCNC: 24 MMOL/L — SIGNIFICANT CHANGE UP (ref 22–31)
CO2 SERPL-SCNC: 27 MMOL/L — SIGNIFICANT CHANGE UP (ref 22–31)
CREAT SERPL-MCNC: 0.68 MG/DL — SIGNIFICANT CHANGE UP (ref 0.5–1.3)
CREAT SERPL-MCNC: 0.75 MG/DL — SIGNIFICANT CHANGE UP (ref 0.5–1.3)
EOSINOPHIL NFR BLD AUTO: 2.8 % — SIGNIFICANT CHANGE UP (ref 0–6)
GLUCOSE SERPL-MCNC: 126 MG/DL — HIGH (ref 70–99)
GLUCOSE SERPL-MCNC: 96 MG/DL — SIGNIFICANT CHANGE UP (ref 70–99)
HCT VFR BLD CALC: 32.1 % — LOW (ref 39–50)
HCT VFR BLD CALC: 34.3 % — LOW (ref 39–50)
HGB BLD-MCNC: 10.5 G/DL — LOW (ref 13–17)
HGB BLD-MCNC: 11.5 G/DL — LOW (ref 13–17)
LYMPHOCYTES # BLD AUTO: 17.6 % — SIGNIFICANT CHANGE UP (ref 13–44)
LYMPHOCYTES # BLD AUTO: 28.5 % — SIGNIFICANT CHANGE UP (ref 13–44)
MAGNESIUM SERPL-MCNC: 1.9 MG/DL — SIGNIFICANT CHANGE UP (ref 1.6–2.6)
MAGNESIUM SERPL-MCNC: 2.2 MG/DL — SIGNIFICANT CHANGE UP (ref 1.6–2.6)
MCHC RBC-ENTMCNC: 29.7 PG — SIGNIFICANT CHANGE UP (ref 27–34)
MCHC RBC-ENTMCNC: 30.2 PG — SIGNIFICANT CHANGE UP (ref 27–34)
MCHC RBC-ENTMCNC: 32.7 G/DL — SIGNIFICANT CHANGE UP (ref 32–36)
MCHC RBC-ENTMCNC: 33.5 G/DL — SIGNIFICANT CHANGE UP (ref 32–36)
MCV RBC AUTO: 90 FL — SIGNIFICANT CHANGE UP (ref 80–100)
MCV RBC AUTO: 90.7 FL — SIGNIFICANT CHANGE UP (ref 80–100)
MONOCYTES NFR BLD AUTO: 10.8 % — SIGNIFICANT CHANGE UP (ref 2–14)
MONOCYTES NFR BLD AUTO: 12.4 % — SIGNIFICANT CHANGE UP (ref 2–14)
NEUTROPHILS NFR BLD AUTO: 57.8 % — SIGNIFICANT CHANGE UP (ref 43–77)
NEUTROPHILS NFR BLD AUTO: 69.9 % — SIGNIFICANT CHANGE UP (ref 43–77)
PHOSPHATE SERPL-MCNC: 3.1 MG/DL — SIGNIFICANT CHANGE UP (ref 2.5–4.5)
PHOSPHATE SERPL-MCNC: 3.2 MG/DL — SIGNIFICANT CHANGE UP (ref 2.5–4.5)
PLATELET # BLD AUTO: 226 K/UL — SIGNIFICANT CHANGE UP (ref 150–400)
PLATELET # BLD AUTO: 272 K/UL — SIGNIFICANT CHANGE UP (ref 150–400)
POTASSIUM SERPL-MCNC: 4.2 MMOL/L — SIGNIFICANT CHANGE UP (ref 3.5–5.3)
POTASSIUM SERPL-MCNC: 4.4 MMOL/L — SIGNIFICANT CHANGE UP (ref 3.5–5.3)
POTASSIUM SERPL-SCNC: 4.2 MMOL/L — SIGNIFICANT CHANGE UP (ref 3.5–5.3)
POTASSIUM SERPL-SCNC: 4.4 MMOL/L — SIGNIFICANT CHANGE UP (ref 3.5–5.3)
RBC # BLD: 3.54 M/UL — LOW (ref 4.2–5.8)
RBC # BLD: 3.81 M/UL — LOW (ref 4.2–5.8)
RBC # FLD: 13.8 % — SIGNIFICANT CHANGE UP (ref 10.3–16.9)
RBC # FLD: 13.9 % — SIGNIFICANT CHANGE UP (ref 10.3–16.9)
SODIUM SERPL-SCNC: 136 MMOL/L — SIGNIFICANT CHANGE UP (ref 135–145)
SODIUM SERPL-SCNC: 137 MMOL/L — SIGNIFICANT CHANGE UP (ref 135–145)
TROPONIN T SERPL-MCNC: 0.01 NG/ML — SIGNIFICANT CHANGE UP (ref 0–0.01)
WBC # BLD: 7.1 K/UL — SIGNIFICANT CHANGE UP (ref 3.8–10.5)
WBC # BLD: 8.4 K/UL — SIGNIFICANT CHANGE UP (ref 3.8–10.5)
WBC # FLD AUTO: 7.1 K/UL — SIGNIFICANT CHANGE UP (ref 3.8–10.5)
WBC # FLD AUTO: 8.4 K/UL — SIGNIFICANT CHANGE UP (ref 3.8–10.5)

## 2019-01-15 PROCEDURE — 71045 X-RAY EXAM CHEST 1 VIEW: CPT | Mod: 26

## 2019-01-15 PROCEDURE — 74018 RADEX ABDOMEN 1 VIEW: CPT | Mod: 26

## 2019-01-15 PROCEDURE — 99222 1ST HOSP IP/OBS MODERATE 55: CPT

## 2019-01-15 RX ORDER — BENZOCAINE AND MENTHOL 5; 1 G/100ML; G/100ML
1 LIQUID ORAL
Qty: 0 | Refills: 0 | Status: DISCONTINUED | OUTPATIENT
Start: 2019-01-15 | End: 2019-01-18

## 2019-01-15 RX ORDER — ONDANSETRON 8 MG/1
4 TABLET, FILM COATED ORAL ONCE
Qty: 0 | Refills: 0 | Status: COMPLETED | OUTPATIENT
Start: 2019-01-15 | End: 2019-01-15

## 2019-01-15 RX ORDER — POLYETHYLENE GLYCOL 3350 17 G/17G
17 POWDER, FOR SOLUTION ORAL ONCE
Qty: 0 | Refills: 0 | Status: COMPLETED | OUTPATIENT
Start: 2019-01-15 | End: 2019-01-15

## 2019-01-15 RX ORDER — METOPROLOL TARTRATE 50 MG
5 TABLET ORAL ONCE
Qty: 0 | Refills: 0 | Status: COMPLETED | OUTPATIENT
Start: 2019-01-15 | End: 2019-01-15

## 2019-01-15 RX ORDER — SODIUM CHLORIDE 9 MG/ML
1000 INJECTION, SOLUTION INTRAVENOUS
Qty: 0 | Refills: 0 | Status: DISCONTINUED | OUTPATIENT
Start: 2019-01-15 | End: 2019-01-18

## 2019-01-15 RX ORDER — MULTIVIT WITH MIN/MFOLATE/K2 340-15/3 G
0.5 POWDER (GRAM) ORAL ONCE
Qty: 0 | Refills: 0 | Status: DISCONTINUED | OUTPATIENT
Start: 2019-01-15 | End: 2019-01-16

## 2019-01-15 RX ADMIN — HEPARIN SODIUM 5000 UNIT(S): 5000 INJECTION INTRAVENOUS; SUBCUTANEOUS at 13:43

## 2019-01-15 RX ADMIN — Medication 100 MILLIGRAM(S): at 05:35

## 2019-01-15 RX ADMIN — ONDANSETRON 4 MILLIGRAM(S): 8 TABLET, FILM COATED ORAL at 20:57

## 2019-01-15 RX ADMIN — Medication 5 MILLIGRAM(S): at 21:48

## 2019-01-15 RX ADMIN — HEPARIN SODIUM 5000 UNIT(S): 5000 INJECTION INTRAVENOUS; SUBCUTANEOUS at 21:48

## 2019-01-15 RX ADMIN — Medication 100 MILLIGRAM(S): at 13:43

## 2019-01-15 RX ADMIN — FINASTERIDE 5 MILLIGRAM(S): 5 TABLET, FILM COATED ORAL at 12:17

## 2019-01-15 RX ADMIN — ONDANSETRON 4 MILLIGRAM(S): 8 TABLET, FILM COATED ORAL at 17:13

## 2019-01-15 RX ADMIN — BENZOCAINE AND MENTHOL 1 LOZENGE: 5; 1 LIQUID ORAL at 05:46

## 2019-01-15 RX ADMIN — POLYETHYLENE GLYCOL 3350 17 GRAM(S): 17 POWDER, FOR SOLUTION ORAL at 12:18

## 2019-01-15 RX ADMIN — ONDANSETRON 4 MILLIGRAM(S): 8 TABLET, FILM COATED ORAL at 10:21

## 2019-01-15 RX ADMIN — HEPARIN SODIUM 5000 UNIT(S): 5000 INJECTION INTRAVENOUS; SUBCUTANEOUS at 05:35

## 2019-01-15 NOTE — DIETITIAN INITIAL EVALUATION ADULT. - NS AS NUTRI INTERV ED CONTENT
Encouraged PO intake, encouraged consumption of protein rich foods. RD to provide DASH/TLC diet education upon follow up.

## 2019-01-15 NOTE — CONSULT NOTE ADULT - SUBJECTIVE AND OBJECTIVE BOX
CHIEF COMPLAINT:    HPI:  73M hx htn, BPH here for simple prostatectomy. (10 Geo 2019 18:07)    PAST MEDICAL & SURGICAL HISTORY:  Benign prostatic hyperplasia with urinary retention  Elevated PSA  HTN (hypertension)  History of surgery: ACL Repair    [ ] Diabetes   [ ] Hypertension  [ ] Hyperlipidemia  [ ] CAD  [ ] PCI  [ ] CABG    PREVIOUS DIAGNOSTIC TESTING:    [ ] Echocardiogram:  [ ] Stress Test:  [ ] Catheterization: 	    FAMILY HISTORY:    SOCIAL HISTORY:    [ ] Non-smoker  [ ] Current Smoker  [ ] Former Smoker  [ ] Alcohol Use  [ ] Drug Use    ALLERGIES/INTOLERANCES:  No Known Allergies    HOME MEDICATIONS:    INPATIENT MEDICATIONS:    heparin  Injectable 5000 Unit(s) SubCutaneous every 8 hours    acetaminophen   Tablet .. 650 milliGRAM(s) Oral every 6 hours PRN  atorvastatin 20 milliGRAM(s) Oral at bedtime  benzocaine 15 mG/menthol 3.6 mG Lozenge 1 Lozenge Oral every 3 hours PRN  docusate sodium 100 milliGRAM(s) Oral three times a day  finasteride 5 milliGRAM(s) Oral daily  lactated ringers. 1000 milliLiter(s) IV Continuous <Continuous>  lidocaine 2% Gel 1 Application(s) Topical every 6 hours PRN  magnesium citrate Oral Solution 0.5 Bottle Oral once  ondansetron Injectable 4 milliGRAM(s) IV Push every 6 hours PRN  senna 1 Tablet(s) Oral at bedtime      REVIEW OF SYSTEMS:  [x] All other review of systems are negative unless indicated above.  [ ] Unable to obtain due to:    PHYSICAL EXAM:    T(C): 36.4 (01-15-19 @ 20:15), Max: 36.8 (01-15-19 @ 15:09)  HR: 152 (01-15-19 @ 20:15) (91 - 152)  BP: 109/74 (01-15-19 @ 20:15) (109/74 - 155/76)  RR: 16 (01-15-19 @ 20:15) (16 - 17)  SpO2: 96% (01-15-19 @ 20:15) (95% - 98%)  Wt(kg): --    I&O's Summary    14 Jan 2019 07:01  -  15 Geo 2019 07:00  --------------------------------------------------------  IN: 360 mL / OUT: 1650 mL / NET: -1290 mL    15 Geo 2019 07:01  -  15 Geo 2019 23:48  --------------------------------------------------------  IN: 700 mL / OUT: 975 mL / NET: -275 mL    GENERAL: NAD, well-developed  HEAD:  NCAT  HEENT: EOMI, PERRL, conjunctiva and sclera clear; moist mucosa; Neck supple, No JVD  CARDIOVASCULAR: RRR, normal S1 S2, no M/R/G, no JVD, neg HJR, nondisplaced PMI, no LE edema  RESPIRATORY: Lungs clear to auscultation b/l, no C/W/R  GASTROINTESTINAL: +BS, soft, non-distended, non-tender, no HSM  VASCULAR: Peripheral pulses palpable 2+ bilaterally  EXTREMITIES: Warm. No clubbing, cyanosis or edema. Normal range of motion.  SKIN: No rashes, lesions, ecchymoses, or cyanosis  NEURO: AAOx3, no focal deficits  PSYCH: Nl behavior, nl affect  LINES:    TELEMETRY: None available    ECG:   EKG (12/26/18): Sinus kerry at 55, norm axis/intervals, no ischemic ST/TW changes  EKG (01/15/19): Afib w/RVR  to 150s, norm axis, QTc 423, no ischemic ST/TW changes  	  LABS:                        11.5   8.4   )-----------( 272      ( 15 Geo 2019 22:10 )             34.3     01-15    136  |  95<L>  |  17  ----------------------------<  126<H>  4.4   |  24  |  0.68    Ca    8.7      15 Geo 2019 22:10  Phos  3.1     01-15  Mg     1.9     01-15        Lipid Profile:   HgA1c:   TSH:     CARDIAC MARKERS:  Trop T: 0.01    proBNP:     RADIOLOGY:  CXR (1/15/18): Primarily for NGT placement so unable to visualize lung apices, however lungs otherwise appear grossly clear CHIEF COMPLAINT:    HPI:  73M w/PMHx HTN, HLD, BPH admitted 1/10/19 for elective lap suprapubic prostatectomy, hosp course c/b new afib w/RVR. Cards c/s for further assessment/mgmt.    Pt underwent procedure 1/10 w/o complications and had been recovering well until HR incidentally noted to be in 150s on VS check in evening 1/15. Pt asx, denied CP, palp, SOB, lightheadedness, dizziness, presyncope. Only complaint was abd distention and NBNB N/V. NGT placed w/200cc brown fluid drainage. Pt denies prev h/o afib, but notes he was told he had a "heart problem" in past for which he was told to lose weight and started on bisoprolol. Denies prev h/o cardiac catheterization or echo, but reports he had normal exercise stress test ~5y ago.    PAST MEDICAL & SURGICAL HISTORY:  Benign prostatic hyperplasia with urinary retention  Elevated PSA  HTN (hypertension)  History of surgery: ACL Repair    [ ] Diabetes   [x] Hypertension  [x] Hyperlipidemia  [ ] CAD  [ ] PCI  [ ] CABG    PREVIOUS DIAGNOSTIC TESTING:    [ ] Echocardiogram:  [ ] Stress Test:  [ ] Catheterization: 	    FAMILY HISTORY:    SOCIAL HISTORY:    [ ] Non-smoker  [ ] Current Smoker  [ ] Former Smoker  [ ] Alcohol Use  [ ] Drug Use    ALLERGIES/INTOLERANCES:  No Known Allergies    HOME MEDICATIONS: Bisoprolol 2.5qd, Lipitor 20, Flomax, Proscar    INPATIENT MEDICATIONS:    heparin  Injectable 5000 Unit(s) SubCutaneous every 8 hours    acetaminophen   Tablet .. 650 milliGRAM(s) Oral every 6 hours PRN  atorvastatin 20 milliGRAM(s) Oral at bedtime  benzocaine 15 mG/menthol 3.6 mG Lozenge 1 Lozenge Oral every 3 hours PRN  docusate sodium 100 milliGRAM(s) Oral three times a day  finasteride 5 milliGRAM(s) Oral daily  lactated ringers. 1000 milliLiter(s) IV Continuous <Continuous>  lidocaine 2% Gel 1 Application(s) Topical every 6 hours PRN  magnesium citrate Oral Solution 0.5 Bottle Oral once  ondansetron Injectable 4 milliGRAM(s) IV Push every 6 hours PRN  senna 1 Tablet(s) Oral at bedtime      REVIEW OF SYSTEMS:  [x] All other review of systems are negative unless indicated above.  [ ] Unable to obtain due to:    PHYSICAL EXAM:    T(C): 36.4 (01-15-19 @ 20:15), Max: 36.8 (01-15-19 @ 15:09)  HR: 152 (01-15-19 @ 20:15) (91 - 152)  BP: 109/74 (01-15-19 @ 20:15) (109/74 - 155/76)  RR: 16 (01-15-19 @ 20:15) (16 - 17)  SpO2: 96% (01-15-19 @ 20:15) (95% - 98%)  Wt(kg): --    I&O's Summary    14 Jan 2019 07:01  -  15 Geo 2019 07:00  --------------------------------------------------------  IN: 360 mL / OUT: 1650 mL / NET: -1290 mL    15 Geo 2019 07:01  -  15 Geo 2019 23:48  --------------------------------------------------------  IN: 700 mL / OUT: 975 mL / NET: -275 mL    GENERAL: NAD, well-developed  HEAD:  NCAT  HEENT: EOMI, PERRL, conjunctiva and sclera clear; moist mucosa; Neck supple, No JVD. NGT in place to intermittent suction  CARDIOVASCULAR: Tachy, irreg irreg, normal S1 S2, no M/R/G, no JVD, neg HJR, nondisplaced PMI, no LE edema  RESPIRATORY: Lungs clear to auscultation b/l, no C/W/R  GASTROINTESTINAL: +BS, soft, distended, non-tender, no G/R, no HSM  : Mtz in place  VASCULAR: Peripheral pulses palpable 2+ bilaterally  EXTREMITIES: Warm. No clubbing, cyanosis or edema. Normal range of motion.  SKIN: No rashes, lesions, ecchymoses, or cyanosis  NEURO: AAOx3, no focal deficits  LINES:    TELEMETRY: None available    ECG:   EKG (12/26/18): Sinus kerry at 55, norm axis/intervals, no ischemic ST/TW changes  EKG (01/15/19): Afib w/RVR  to 150s, norm axis, QTc 423, no ischemic ST/TW changes  	  LABS:                        11.5   8.4   )-----------( 272      ( 15 Geo 2019 22:10 )             34.3     01-15    136  |  95<L>  |  17  ----------------------------<  126<H>  4.4   |  24  |  0.68    Ca    8.7      15 Geo 2019 22:10  Phos  3.1     01-15  Mg     1.9     01-15        Lipid Profile:   HgA1c:   TSH:     CARDIAC MARKERS:  Trop T: 0.01    proBNP:     RADIOLOGY:  CXR (1/15/18): Primarily for NGT placement so unable to visualize lung apices, however lungs otherwise appear grossly clear

## 2019-01-15 NOTE — CONSULT NOTE ADULT - ATTENDING COMMENTS
Assessment: Patient personally seen and examined myself during rounds with the Physician Assistant/House Staff/Nurse Practitioner   ON DATE 1/15/19  Physician Assistant/House Staff/Nurse Practitioner note read, including vitals, physical findings, laboratory data, and radiological reports.   Revisions included below.   Direct personal management at bed side and extensive interpretation of the data.    Plan was outlined and discussed in details with the Physician Assistant/House Staff/Nurse Practitioner.    Decision making of high complexity   Risk high of complications, morbidity, and/or mortality  Assessment and Action taken for acute disease activity to reflect the level of care provided:  -Hemodynamic evaluation and support  -Medication reconciliation  -Review laboratory data  -EKG reviewed   -Echo reviewed   -ACS assessment and treatment as applicable  -Heart failure assessment and treatment as applicable  -Cardiac Telemetry reviewed  -Interdisciplinary discussion with IC / EP / HF / CTS teams as needed  TIME SPENT in evaluation and management, reassessments, review and interpretation of labs and x-rays, and hemodynamic management, formulating a plan and coordinating care: ___50____ MIN.  Time does not include procedural time.    Pérez Campos MD  Cardiology     Mobile: [phone]  Office: [phone]  [address] [place], [place] [address]

## 2019-01-15 NOTE — CONSULT NOTE ADULT - ASSESSMENT
- During examination pt w/afib w/-140s on Zoll monitor, BP stable  - Received lopressor 5 IVPx1, unlikely to provide sufficient rate control. If pt able to tolerate PO would start Lopressor 12.5mg PO BID and uptitrate as BP/HR allow, goal HR<110. If unable to tolerate can start lopressor 5mg IVP q6h for now (may need inc dosing)  - Assess and treat any underlying factors which may be increasing sympathetic drive (i.e. pain, fever, dehydration)  - Check TSH, monitor lytes, keep K>4, Mg>2  - Would repeat EKG in AM once rate better controlled  - Nonurgent TTE to assess for underlying structural heart dz  - Pt w/o anginal sx, EKG w/o acute ischemic changes, Trop T 0.01 -> unlikely ACS. No need to further trend CE unless pt develops new sx or HD instability  - Please get documentation of prev outpt cards evaluation  - CHADSVASC 2, annual CVA risk ~2.2% annually. No urgent need for A/C, however will need to d/w pt starting long term oral A/C (possibly DOAC) when acceptable from surgical perspective  - Would transfer to tele for closer monitoring    Pt will be seen and followed by Dr. Campos in AM.    --  River Lnog MD  Cardiology Fellow PGY-4 - During examination pt w/afib w/-140s on Zoll monitor, BP stable  - Received lopressor 5 IVPx1, unlikely to provide sufficient rate control. If pt able to tolerate PO would start Lopressor 12.5mg PO BID and uptitrate as BP/HR allow, goal HR<110. If unable to tolerate can start lopressor 5mg IVP q6h for now (may need inc dosing)  - Assess and treat any underlying factors which may be increasing sympathetic drive (i.e. pain, fever, dehydration)  - Check TSH, monitor lytes, keep K>4, Mg>2  - Would repeat EKG in AM once rate better controlled  - Nonurgent TTE to assess for underlying structural heart dz  - Pt w/o anginal sx, EKG w/o acute ischemic changes, Trop T 0.01 -> unlikely ACS. No need to further trend CE unless pt develops new sx or HD instability  - Review of pre-op clearance documents "chronic ischemic heart disease" but pt denies this dx. Please get documentation of prev outpt cards (Dr. Jose Owens) evaluation  - CHADSVASC 2, annual CVA risk ~2.2% annually. No urgent need for A/C, however will need to d/w pt starting long term oral A/C (possibly DOAC) when acceptable from surgical perspective  - Would transfer to tele for closer monitoring    Pt will be seen and followed by Dr. Campos in AM.    --  River Long MD  Cardiology Fellow PGY-4 73M w/PMHx HTN, HLD, BPH admitted 1/10/19 for elective lap suprapubic prostatectomy, hosp course c/b new afib w/RVR. Cards c/s for further assessment/mgmt.    - During examination pt w/afib w/-140s on Zoll monitor, BP stable  - Received lopressor 5 IVPx1, unlikely to provide sufficient rate control. If pt able to tolerate PO would start Lopressor 12.5mg PO BID and uptitrate as BP/HR allow, goal HR<110. If unable to tolerate can start lopressor 5mg IVP q6h for now (may need inc dosing)  - Assess and treat any underlying factors which may be increasing sympathetic drive (i.e. pain, fever, dehydration 2/2 ileus)  - Check TSH, monitor lytes, keep K>4, Mg>2  - Would repeat EKG in AM once rate better controlled  - Nonurgent TTE to assess for underlying structural heart dz  - Pt w/o anginal sx, EKG w/o acute ischemic changes, Trop T 0.01 -> unlikely ACS. No need to further trend CE unless pt develops new sx or HD instability  - Review of pre-op clearance documents "chronic ischemic heart disease" but pt denies this dx. Please get documentation of prev outpt cards (Dr. Jose Owens) evaluation  - CHADSVASC 2, annual CVA risk ~2.2% annually. No urgent need for A/C, however will need to d/w pt starting long term oral A/C (possibly DOAC) when acceptable from surgical perspective  - Would transfer to tele for closer monitoring    Pt will be seen and followed by Dr. Campos in AM.    --  River Long MD  Cardiology Fellow PGY-4 73M w/PMHx HTN, HLD, BPH admitted 1/10/19 for elective lap suprapubic prostatectomy, hosp course c/b new afib w/RVR in setting of post-op ileus. Cards c/s for further assessment/mgmt.    - During examination pt w/afib w/-140s on Zoll monitor, BP stable  - Received lopressor 5 IVPx1, unlikely to provide sufficient rate control. If pt able to tolerate PO would start Lopressor 12.5mg PO BID and uptitrate as BP/HR allow, goal HR<110. If unable to tolerate can start lopressor 5mg IVP q6h for now (may need inc dosing)  - Assess and treat any underlying factors which may be increasing sympathetic drive (i.e. pain, fever, dehydration 2/2 ileus)  - Check TSH, monitor lytes, keep K>4, Mg>2  - Would repeat EKG in AM once rate better controlled  - Nonurgent TTE to assess for underlying structural heart dz  - Pt w/o anginal sx, EKG w/o acute ischemic changes, Trop T 0.01 -> unlikely ACS. No need to further trend CE unless pt develops new sx or HD instability  - Review of pre-op clearance documents "chronic ischemic heart disease" but pt denies this dx. Please get documentation of prev outpt cards (Dr. Jose Owens) evaluation  - CHADSVASC 2, annual CVA risk ~2.2% annually. No urgent need for A/C, however will need to d/w pt starting long term oral A/C (possibly DOAC) when acceptable from surgical perspective  - Would transfer to tele for closer monitoring    Pt will be seen and followed by Dr. Campos in AM.    --  River Long MD  Cardiology Fellow PGY-4

## 2019-01-15 NOTE — DIETITIAN INITIAL EVALUATION ADULT. - OTHER INFO
Pt seen for initial assessment. Admit: S/P laparoscopic prostatectomy 1/10. PMH: HTN, elevated PSA, BPH w/ urinary retention. Skin: no edema, +surgical incision. Pt seen sitting on bed, awake, alert. Pt is on regular diet with 15% PO intake 2/2 decreased appetite. Reported .4kg x6 months ago w/ 15.9kg wt loss 2/2 decreased portion sizes, current wt 93.7kg. NKFA. Denies C/D w/ last BM 1/12 per pt, endorsing N w/o V, RD informed RN. Reporting pain, RN aware. RD to follow up per protocol.

## 2019-01-16 LAB
ANION GAP SERPL CALC-SCNC: 14 MMOL/L — SIGNIFICANT CHANGE UP (ref 5–17)
BASOPHILS NFR BLD AUTO: 0.1 % — SIGNIFICANT CHANGE UP (ref 0–2)
BUN SERPL-MCNC: 17 MG/DL — SIGNIFICANT CHANGE UP (ref 7–23)
CALCIUM SERPL-MCNC: 8.6 MG/DL — SIGNIFICANT CHANGE UP (ref 8.4–10.5)
CHLORIDE SERPL-SCNC: 94 MMOL/L — LOW (ref 96–108)
CO2 SERPL-SCNC: 27 MMOL/L — SIGNIFICANT CHANGE UP (ref 22–31)
CREAT SERPL-MCNC: 0.65 MG/DL — SIGNIFICANT CHANGE UP (ref 0.5–1.3)
EOSINOPHIL NFR BLD AUTO: 0.1 % — SIGNIFICANT CHANGE UP (ref 0–6)
GLUCOSE SERPL-MCNC: 116 MG/DL — HIGH (ref 70–99)
HCT VFR BLD CALC: 34 % — LOW (ref 39–50)
HGB BLD-MCNC: 11.2 G/DL — LOW (ref 13–17)
LYMPHOCYTES # BLD AUTO: 22.7 % — SIGNIFICANT CHANGE UP (ref 13–44)
MAGNESIUM SERPL-MCNC: 2.6 MG/DL — SIGNIFICANT CHANGE UP (ref 1.6–2.6)
MCHC RBC-ENTMCNC: 29.6 PG — SIGNIFICANT CHANGE UP (ref 27–34)
MCHC RBC-ENTMCNC: 32.9 G/DL — SIGNIFICANT CHANGE UP (ref 32–36)
MCV RBC AUTO: 89.9 FL — SIGNIFICANT CHANGE UP (ref 80–100)
MONOCYTES NFR BLD AUTO: 13.3 % — SIGNIFICANT CHANGE UP (ref 2–14)
NEUTROPHILS NFR BLD AUTO: 63.8 % — SIGNIFICANT CHANGE UP (ref 43–77)
PHOSPHATE SERPL-MCNC: 3.1 MG/DL — SIGNIFICANT CHANGE UP (ref 2.5–4.5)
PLATELET # BLD AUTO: 283 K/UL — SIGNIFICANT CHANGE UP (ref 150–400)
POTASSIUM SERPL-MCNC: 3.7 MMOL/L — SIGNIFICANT CHANGE UP (ref 3.5–5.3)
POTASSIUM SERPL-SCNC: 3.7 MMOL/L — SIGNIFICANT CHANGE UP (ref 3.5–5.3)
RBC # BLD: 3.78 M/UL — LOW (ref 4.2–5.8)
RBC # FLD: 14.2 % — SIGNIFICANT CHANGE UP (ref 10.3–16.9)
SODIUM SERPL-SCNC: 135 MMOL/L — SIGNIFICANT CHANGE UP (ref 135–145)
TSH SERPL-MCNC: 0.75 UIU/ML — SIGNIFICANT CHANGE UP (ref 0.35–4.94)
WBC # BLD: 8.2 K/UL — SIGNIFICANT CHANGE UP (ref 3.8–10.5)
WBC # FLD AUTO: 8.2 K/UL — SIGNIFICANT CHANGE UP (ref 3.8–10.5)

## 2019-01-16 PROCEDURE — 74019 RADEX ABDOMEN 2 VIEWS: CPT | Mod: 26

## 2019-01-16 PROCEDURE — 93306 TTE W/DOPPLER COMPLETE: CPT | Mod: 26

## 2019-01-16 PROCEDURE — 99232 SBSQ HOSP IP/OBS MODERATE 35: CPT

## 2019-01-16 RX ORDER — MAGNESIUM SULFATE 500 MG/ML
2 VIAL (ML) INJECTION ONCE
Qty: 0 | Refills: 0 | Status: COMPLETED | OUTPATIENT
Start: 2019-01-16 | End: 2019-01-16

## 2019-01-16 RX ORDER — METOPROLOL TARTRATE 50 MG
12.5 TABLET ORAL
Qty: 0 | Refills: 0 | Status: DISCONTINUED | OUTPATIENT
Start: 2019-01-16 | End: 2019-01-18

## 2019-01-16 RX ORDER — POTASSIUM CHLORIDE 20 MEQ
40 PACKET (EA) ORAL ONCE
Qty: 0 | Refills: 0 | Status: COMPLETED | OUTPATIENT
Start: 2019-01-16 | End: 2019-01-16

## 2019-01-16 RX ORDER — METOPROLOL TARTRATE 50 MG
5 TABLET ORAL EVERY 6 HOURS
Qty: 0 | Refills: 0 | Status: DISCONTINUED | OUTPATIENT
Start: 2019-01-16 | End: 2019-01-16

## 2019-01-16 RX ADMIN — SODIUM CHLORIDE 100 MILLILITER(S): 9 INJECTION, SOLUTION INTRAVENOUS at 16:54

## 2019-01-16 RX ADMIN — HEPARIN SODIUM 5000 UNIT(S): 5000 INJECTION INTRAVENOUS; SUBCUTANEOUS at 21:19

## 2019-01-16 RX ADMIN — Medication 5 MILLIGRAM(S): at 16:54

## 2019-01-16 RX ADMIN — Medication 50 GRAM(S): at 02:45

## 2019-01-16 RX ADMIN — Medication 5 MILLIGRAM(S): at 10:56

## 2019-01-16 RX ADMIN — ATORVASTATIN CALCIUM 20 MILLIGRAM(S): 80 TABLET, FILM COATED ORAL at 21:19

## 2019-01-16 RX ADMIN — Medication 5 MILLIGRAM(S): at 03:50

## 2019-01-16 RX ADMIN — Medication 12.5 MILLIGRAM(S): at 18:00

## 2019-01-16 RX ADMIN — SENNA PLUS 1 TABLET(S): 8.6 TABLET ORAL at 21:19

## 2019-01-16 RX ADMIN — Medication 100 MILLIGRAM(S): at 21:20

## 2019-01-16 RX ADMIN — ONDANSETRON 4 MILLIGRAM(S): 8 TABLET, FILM COATED ORAL at 00:33

## 2019-01-16 RX ADMIN — SODIUM CHLORIDE 100 MILLILITER(S): 9 INJECTION, SOLUTION INTRAVENOUS at 05:37

## 2019-01-16 RX ADMIN — FINASTERIDE 5 MILLIGRAM(S): 5 TABLET, FILM COATED ORAL at 14:19

## 2019-01-16 RX ADMIN — Medication 100 MILLIGRAM(S): at 14:11

## 2019-01-16 RX ADMIN — HEPARIN SODIUM 5000 UNIT(S): 5000 INJECTION INTRAVENOUS; SUBCUTANEOUS at 06:20

## 2019-01-16 RX ADMIN — Medication 40 MILLIEQUIVALENT(S): at 14:11

## 2019-01-16 RX ADMIN — HEPARIN SODIUM 5000 UNIT(S): 5000 INJECTION INTRAVENOUS; SUBCUTANEOUS at 14:11

## 2019-01-17 LAB
ANION GAP SERPL CALC-SCNC: 13 MMOL/L — SIGNIFICANT CHANGE UP (ref 5–17)
BASOPHILS NFR BLD AUTO: 0.4 % — SIGNIFICANT CHANGE UP (ref 0–2)
BUN SERPL-MCNC: 22 MG/DL — SIGNIFICANT CHANGE UP (ref 7–23)
CALCIUM SERPL-MCNC: 8.6 MG/DL — SIGNIFICANT CHANGE UP (ref 8.4–10.5)
CHLORIDE SERPL-SCNC: 99 MMOL/L — SIGNIFICANT CHANGE UP (ref 96–108)
CO2 SERPL-SCNC: 27 MMOL/L — SIGNIFICANT CHANGE UP (ref 22–31)
CREAT SERPL-MCNC: 0.76 MG/DL — SIGNIFICANT CHANGE UP (ref 0.5–1.3)
EOSINOPHIL NFR BLD AUTO: 2.9 % — SIGNIFICANT CHANGE UP (ref 0–6)
GLUCOSE SERPL-MCNC: 90 MG/DL — SIGNIFICANT CHANGE UP (ref 70–99)
HCT VFR BLD CALC: 29.5 % — LOW (ref 39–50)
HGB BLD-MCNC: 9.6 G/DL — LOW (ref 13–17)
LYMPHOCYTES # BLD AUTO: 36.5 % — SIGNIFICANT CHANGE UP (ref 13–44)
MAGNESIUM SERPL-MCNC: 2.2 MG/DL — SIGNIFICANT CHANGE UP (ref 1.6–2.6)
MCHC RBC-ENTMCNC: 29.7 PG — SIGNIFICANT CHANGE UP (ref 27–34)
MCHC RBC-ENTMCNC: 32.5 G/DL — SIGNIFICANT CHANGE UP (ref 32–36)
MCV RBC AUTO: 91.3 FL — SIGNIFICANT CHANGE UP (ref 80–100)
MONOCYTES NFR BLD AUTO: 11.7 % — SIGNIFICANT CHANGE UP (ref 2–14)
NEUTROPHILS NFR BLD AUTO: 48.5 % — SIGNIFICANT CHANGE UP (ref 43–77)
PHOSPHATE SERPL-MCNC: 2.8 MG/DL — SIGNIFICANT CHANGE UP (ref 2.5–4.5)
PLATELET # BLD AUTO: 327 K/UL — SIGNIFICANT CHANGE UP (ref 150–400)
POTASSIUM SERPL-MCNC: 4.3 MMOL/L — SIGNIFICANT CHANGE UP (ref 3.5–5.3)
POTASSIUM SERPL-SCNC: 4.3 MMOL/L — SIGNIFICANT CHANGE UP (ref 3.5–5.3)
RBC # BLD: 3.23 M/UL — LOW (ref 4.2–5.8)
RBC # FLD: 14.6 % — SIGNIFICANT CHANGE UP (ref 10.3–16.9)
SODIUM SERPL-SCNC: 139 MMOL/L — SIGNIFICANT CHANGE UP (ref 135–145)
WBC # BLD: 8.5 K/UL — SIGNIFICANT CHANGE UP (ref 3.8–10.5)
WBC # FLD AUTO: 8.5 K/UL — SIGNIFICANT CHANGE UP (ref 3.8–10.5)

## 2019-01-17 PROCEDURE — 99232 SBSQ HOSP IP/OBS MODERATE 35: CPT

## 2019-01-17 RX ADMIN — Medication 100 MILLIGRAM(S): at 05:09

## 2019-01-17 RX ADMIN — FINASTERIDE 5 MILLIGRAM(S): 5 TABLET, FILM COATED ORAL at 13:01

## 2019-01-17 RX ADMIN — HEPARIN SODIUM 5000 UNIT(S): 5000 INJECTION INTRAVENOUS; SUBCUTANEOUS at 21:40

## 2019-01-17 RX ADMIN — Medication 12.5 MILLIGRAM(S): at 05:09

## 2019-01-17 RX ADMIN — SENNA PLUS 1 TABLET(S): 8.6 TABLET ORAL at 21:40

## 2019-01-17 RX ADMIN — Medication 100 MILLIGRAM(S): at 21:40

## 2019-01-17 RX ADMIN — ATORVASTATIN CALCIUM 20 MILLIGRAM(S): 80 TABLET, FILM COATED ORAL at 21:40

## 2019-01-17 RX ADMIN — HEPARIN SODIUM 5000 UNIT(S): 5000 INJECTION INTRAVENOUS; SUBCUTANEOUS at 05:09

## 2019-01-17 RX ADMIN — Medication 12.5 MILLIGRAM(S): at 17:46

## 2019-01-17 RX ADMIN — HEPARIN SODIUM 5000 UNIT(S): 5000 INJECTION INTRAVENOUS; SUBCUTANEOUS at 13:01

## 2019-01-17 RX ADMIN — Medication 100 MILLIGRAM(S): at 13:01

## 2019-01-17 NOTE — PROGRESS NOTE ADULT - ATTENDING COMMENTS
Assessment: Patient personally seen and examined myself during rounds with the Physician Assistant/House Staff/Nurse Practitioner  ON DATE 1/17/19  Physician Assistant/House Staff/Nurse Practitioner note read, including vitals, physical findings, laboratory data, and radiological reports.   Revisions included below.   Direct personal management at bed side and extensive interpretation of the data.    Plan was outlined and discussed in details with the Physician Assistant/House Staff/Nurse Practitioner.    Decision making of high complexity   Risk high of complications, morbidity, and/or mortality  Assessment and Action taken for acute disease activity to reflect the level of care provided:  -Hemodynamic evaluation and support  -Medication reconciliation  -Review laboratory data  -EKG reviewed   -    TIME SPENT in evaluation and management, reassessments, review and interpretation of labs and x-rays, and hemodynamic management, formulating a plan and coordinating care: ___25____ MIN.  Time does not include procedural time.    Pérez Campos MD  Cardiology    Mobile: [phone]  Office: [phone]  [address] [place] [address], [id number]

## 2019-01-18 VITALS
DIASTOLIC BLOOD PRESSURE: 60 MMHG | RESPIRATION RATE: 16 BRPM | OXYGEN SATURATION: 97 % | SYSTOLIC BLOOD PRESSURE: 135 MMHG | TEMPERATURE: 98 F | HEART RATE: 93 BPM

## 2019-01-18 LAB
ANION GAP SERPL CALC-SCNC: 14 MMOL/L — SIGNIFICANT CHANGE UP (ref 5–17)
BASOPHILS NFR BLD AUTO: 0.5 % — SIGNIFICANT CHANGE UP (ref 0–2)
BUN SERPL-MCNC: 13 MG/DL — SIGNIFICANT CHANGE UP (ref 7–23)
CALCIUM SERPL-MCNC: 8.5 MG/DL — SIGNIFICANT CHANGE UP (ref 8.4–10.5)
CHLORIDE SERPL-SCNC: 98 MMOL/L — SIGNIFICANT CHANGE UP (ref 96–108)
CO2 SERPL-SCNC: 25 MMOL/L — SIGNIFICANT CHANGE UP (ref 22–31)
CREAT SERPL-MCNC: 0.67 MG/DL — SIGNIFICANT CHANGE UP (ref 0.5–1.3)
EOSINOPHIL NFR BLD AUTO: 3.4 % — SIGNIFICANT CHANGE UP (ref 0–6)
GLUCOSE BLDC GLUCOMTR-MCNC: 111 MG/DL — HIGH (ref 70–99)
GLUCOSE SERPL-MCNC: 90 MG/DL — SIGNIFICANT CHANGE UP (ref 70–99)
HCT VFR BLD CALC: 29.8 % — LOW (ref 39–50)
HGB BLD-MCNC: 9.6 G/DL — LOW (ref 13–17)
LYMPHOCYTES # BLD AUTO: 35 % — SIGNIFICANT CHANGE UP (ref 13–44)
MAGNESIUM SERPL-MCNC: 2.2 MG/DL — SIGNIFICANT CHANGE UP (ref 1.6–2.6)
MCHC RBC-ENTMCNC: 29.4 PG — SIGNIFICANT CHANGE UP (ref 27–34)
MCHC RBC-ENTMCNC: 32.2 G/DL — SIGNIFICANT CHANGE UP (ref 32–36)
MCV RBC AUTO: 91.1 FL — SIGNIFICANT CHANGE UP (ref 80–100)
MONOCYTES NFR BLD AUTO: 14.1 % — HIGH (ref 2–14)
NEUTROPHILS NFR BLD AUTO: 47 % — SIGNIFICANT CHANGE UP (ref 43–77)
PHOSPHATE SERPL-MCNC: 3.1 MG/DL — SIGNIFICANT CHANGE UP (ref 2.5–4.5)
PLATELET # BLD AUTO: 306 K/UL — SIGNIFICANT CHANGE UP (ref 150–400)
POTASSIUM SERPL-MCNC: 3.8 MMOL/L — SIGNIFICANT CHANGE UP (ref 3.5–5.3)
POTASSIUM SERPL-SCNC: 3.8 MMOL/L — SIGNIFICANT CHANGE UP (ref 3.5–5.3)
RBC # BLD: 3.27 M/UL — LOW (ref 4.2–5.8)
RBC # FLD: 14.9 % — SIGNIFICANT CHANGE UP (ref 10.3–16.9)
SODIUM SERPL-SCNC: 137 MMOL/L — SIGNIFICANT CHANGE UP (ref 135–145)
WBC # BLD: 6.4 K/UL — SIGNIFICANT CHANGE UP (ref 3.8–10.5)
WBC # FLD AUTO: 6.4 K/UL — SIGNIFICANT CHANGE UP (ref 3.8–10.5)

## 2019-01-18 PROCEDURE — 74018 RADEX ABDOMEN 1 VIEW: CPT

## 2019-01-18 PROCEDURE — 84443 ASSAY THYROID STIM HORMONE: CPT

## 2019-01-18 PROCEDURE — 97161 PT EVAL LOW COMPLEX 20 MIN: CPT

## 2019-01-18 PROCEDURE — 84100 ASSAY OF PHOSPHORUS: CPT

## 2019-01-18 PROCEDURE — 82962 GLUCOSE BLOOD TEST: CPT

## 2019-01-18 PROCEDURE — 86850 RBC ANTIBODY SCREEN: CPT

## 2019-01-18 PROCEDURE — 80048 BASIC METABOLIC PNL TOTAL CA: CPT

## 2019-01-18 PROCEDURE — 85027 COMPLETE CBC AUTOMATED: CPT

## 2019-01-18 PROCEDURE — 97116 GAIT TRAINING THERAPY: CPT

## 2019-01-18 PROCEDURE — 99232 SBSQ HOSP IP/OBS MODERATE 35: CPT

## 2019-01-18 PROCEDURE — 84484 ASSAY OF TROPONIN QUANT: CPT

## 2019-01-18 PROCEDURE — 71045 X-RAY EXAM CHEST 1 VIEW: CPT

## 2019-01-18 PROCEDURE — 93306 TTE W/DOPPLER COMPLETE: CPT

## 2019-01-18 PROCEDURE — 72192 CT PELVIS W/O DYE: CPT

## 2019-01-18 PROCEDURE — 82570 ASSAY OF URINE CREATININE: CPT

## 2019-01-18 PROCEDURE — C1889: CPT

## 2019-01-18 PROCEDURE — 86900 BLOOD TYPING SEROLOGIC ABO: CPT

## 2019-01-18 PROCEDURE — 36415 COLL VENOUS BLD VENIPUNCTURE: CPT

## 2019-01-18 PROCEDURE — S2900: CPT

## 2019-01-18 PROCEDURE — 86901 BLOOD TYPING SEROLOGIC RH(D): CPT

## 2019-01-18 PROCEDURE — 85025 COMPLETE CBC W/AUTO DIFF WBC: CPT

## 2019-01-18 PROCEDURE — 74019 RADEX ABDOMEN 2 VIEWS: CPT

## 2019-01-18 PROCEDURE — 83735 ASSAY OF MAGNESIUM: CPT

## 2019-01-18 PROCEDURE — 88307 TISSUE EXAM BY PATHOLOGIST: CPT

## 2019-01-18 RX ORDER — CEPHALEXIN 500 MG
1 CAPSULE ORAL
Qty: 6 | Refills: 0 | OUTPATIENT
Start: 2019-01-18 | End: 2019-01-20

## 2019-01-18 RX ORDER — SODIUM CHLORIDE 9 MG/ML
3 INJECTION INTRAMUSCULAR; INTRAVENOUS; SUBCUTANEOUS EVERY 8 HOURS
Qty: 0 | Refills: 0 | Status: DISCONTINUED | OUTPATIENT
Start: 2019-01-18 | End: 2019-01-18

## 2019-01-18 RX ORDER — BISOPROLOL FUMARATE 10 MG/1
2.5 TABLET, FILM COATED ORAL
Qty: 0 | Refills: 0 | COMMUNITY

## 2019-01-18 RX ORDER — POTASSIUM CHLORIDE 20 MEQ
10 PACKET (EA) ORAL ONCE
Qty: 0 | Refills: 0 | Status: COMPLETED | OUTPATIENT
Start: 2019-01-18 | End: 2019-01-18

## 2019-01-18 RX ORDER — METOPROLOL TARTRATE 50 MG
0.5 TABLET ORAL
Qty: 15 | Refills: 0 | OUTPATIENT
Start: 2019-01-18 | End: 2019-02-16

## 2019-01-18 RX ORDER — ASPIRIN/CALCIUM CARB/MAGNESIUM 324 MG
1 TABLET ORAL
Qty: 30 | Refills: 0 | OUTPATIENT
Start: 2019-01-18 | End: 2019-02-16

## 2019-01-18 RX ADMIN — SODIUM CHLORIDE 3 MILLILITER(S): 9 INJECTION INTRAMUSCULAR; INTRAVENOUS; SUBCUTANEOUS at 13:18

## 2019-01-18 RX ADMIN — HEPARIN SODIUM 5000 UNIT(S): 5000 INJECTION INTRAVENOUS; SUBCUTANEOUS at 13:18

## 2019-01-18 RX ADMIN — FINASTERIDE 5 MILLIGRAM(S): 5 TABLET, FILM COATED ORAL at 11:40

## 2019-01-18 RX ADMIN — SODIUM CHLORIDE 100 MILLILITER(S): 9 INJECTION, SOLUTION INTRAVENOUS at 00:50

## 2019-01-18 RX ADMIN — Medication 10 MILLIEQUIVALENT(S): at 11:40

## 2019-01-18 RX ADMIN — Medication 12.5 MILLIGRAM(S): at 05:52

## 2019-01-18 RX ADMIN — HEPARIN SODIUM 5000 UNIT(S): 5000 INJECTION INTRAVENOUS; SUBCUTANEOUS at 05:52

## 2019-01-18 RX ADMIN — Medication 100 MILLIGRAM(S): at 05:52

## 2019-01-18 NOTE — PROGRESS NOTE ADULT - PROVIDER SPECIALTY LIST ADULT
Cardiology
Cardiology
Urology
Cardiology

## 2019-01-18 NOTE — PROGRESS NOTE ADULT - SUBJECTIVE AND OBJECTIVE BOX
Chief Complaint/Reason for Consult: afib  INTERVAL HPI: HR at goal and nsr no post conversion pauses noted, feels better with NGT  	  MEDICATIONS:  metoprolol tartrate Injectable 5 milliGRAM(s) IV Push every 6 hours        acetaminophen   Tablet .. 650 milliGRAM(s) Oral every 6 hours PRN  ondansetron Injectable 4 milliGRAM(s) IV Push every 6 hours PRN    docusate sodium 100 milliGRAM(s) Oral three times a day  senna 1 Tablet(s) Oral at bedtime    atorvastatin 20 milliGRAM(s) Oral at bedtime  finasteride 5 milliGRAM(s) Oral daily    benzocaine 15 mG/menthol 3.6 mG Lozenge 1 Lozenge Oral every 3 hours PRN  heparin  Injectable 5000 Unit(s) SubCutaneous every 8 hours  lactated ringers. 1000 milliLiter(s) IV Continuous <Continuous>  lidocaine 2% Gel 1 Application(s) Topical every 6 hours PRN  potassium chloride    Tablet ER 40 milliEquivalent(s) Oral once      REVIEW OF SYSTEMS:  [x] As per HPI  CONSTITUTIONAL: No fever, weight loss, or fatigue  RESPIRATORY: No cough, wheezing, chills or hemoptysis; No Shortness of Breath  CARDIOVASCULAR: No chest pain, palpitations, dizziness, or leg swelling  GASTROINTESTINAL: No abdominal or epigastric pain. No nausea, vomiting, or hematemesis; No diarrhea or constipation. No melena or hematochezia.  MUSCULOSKELETAL: No joint pain or swelling; No muscle, back, or extremity pain  [x] All others negative	  [ ] Unable to obtain    PHYSICAL EXAM:  T(C): 36.4 (01-16-19 @ 10:00), Max: 37.3 (01-15-19 @ 23:59)  HR: 87 (01-16-19 @ 10:00) (87 - 152)  BP: 130/70 (01-16-19 @ 10:00) (109/74 - 154/89)  RR: 16 (01-16-19 @ 10:00) (14 - 17)  SpO2: 97% (01-16-19 @ 10:00) (93% - 98%)  Wt(kg): --  I&O's Summary    15 Geo 2019 07:01  -  16 Jan 2019 07:00  --------------------------------------------------------  IN: 1550 mL / OUT: 1400 mL / NET: 150 mL          Appearance: Normal	  HEENT:   Normal oral mucosa  Cardiovascular: Normal S1 S2, No JVD, No murmurs, No edema  Respiratory: Lungs clear to auscultation	  Gastrointestinal:  Soft, Non-tender, + BS	  Extremities: Normal range of motion, No clubbing, cyanosis or edema  Vascular: Peripheral pulses palpable 2+ bilaterally    TELEMETRY: 	    ECG:   	  RADIOLOGY:   CXR:  CT:  US:    CARDIAC TESTING:  Echocardiogram:  Catheterization:  Stress Test:      LABS:	 	    CARDIAC MARKERS:                                  11.2   8.2   )-----------( 283      ( 16 Jan 2019 05:50 )             34.0     01-16    135  |  94<L>  |  17  ----------------------------<  116<H>  3.7   |  27  |  0.65    Ca    8.6      16 Jan 2019 05:50  Phos  3.1     01-16  Mg     2.6     01-16      proBNP:   Lipid Profile:   HgA1c:   TSH: Thyroid Stimulating Hormone, Serum: 0.746 uIU/mL (01-16 @ 05:50)      ASSESSMENT/PLAN: 	    HR sinus converted to sr - continue BB      If pt able to tolerate PO would start Lopressor 12.5mg PO BID and uptitrate as BP/HR allow, goal HR<110. If unable to tolerate can start lopressor 5mg IVP q6h for now (may need inc dosing)    - Assess and treat any underlying factors - improved with NGT deompression  - keep K>4, Mg>2  - Check echo to assess for underlying structural heart dz  - Pt w/o anginal sx, EKG w/o acute ischemic changes, Trop T 0.01 -> unlikely ACS. No need to further trend CE unless pt develops new sx or HD instability  - CHADSVASC 2, annual CVA risk ~2.2% annually. No urgent need for A/C, however will need to d/w pt starting long term oral A/C (possibly DOAC) when acceptable from surgical perspective  - Eliquis 5mg PO BIDT, check echo
INTERVAL HPI/OVERNIGHT EVENTS:  No acute events overnight.    VITALS:    T(F): 96.1 (01-11-19 @ 05:19), Max: 98.2 (01-10-19 @ 21:15)  HR: 68 (01-11-19 @ 04:40) (68 - 80)  BP: 108/59 (01-11-19 @ 04:40) (97/52 - 135/79)  RR: 18 (01-11-19 @ 04:40) (11 - 21)  SpO2: 96% (01-11-19 @ 04:40) (96% - 100%)  Wt(kg): --    I&O's Detail    10 Geo 2019 07:01  -  11 Jan 2019 05:37  --------------------------------------------------------  IN:    Continuous Bladder Irrigation: 74661 mL    IV PiggyBack: 50 mL    lactated ringers.: 1000 mL  Total IN: 50307 mL    OUT:    Continuous Bladder Irrigation: 02675 mL    Drain: 35 mL  Total OUT: 93081 mL    Total NET: 1965 mL          MEDICATIONS:    ANTIBIOTICS:  ceFAZolin   IVPB 1000 milliGRAM(s) IV Intermittent every 8 hours      PAIN CONTROL:  acetaminophen   Tablet .. 1000 milliGRAM(s) Oral every 6 hours  belladonna 16.2 mG/opium 60 mg Suppository 1 Suppository(s) Rectal every 8 hours  HYDROmorphone   Tablet 4 milliGRAM(s) Oral every 4 hours PRN  HYDROmorphone   Tablet 2 milliGRAM(s) Oral every 4 hours PRN  ondansetron Injectable 4 milliGRAM(s) IV Push every 6 hours PRN       MEDS:  oxybutynin 5 milliGRAM(s) Oral every 8 hours      HEME/ONC  heparin  Injectable 5000 Unit(s) SubCutaneous every 8 hours        PHYSICAL EXAM:  General: No acute distress.  Alert and Oriented  Abdominal Exam: soft, appropriately tender. ELA drain in place w/ serosanguinous op   Exam: Mtz cath in place on CBI draining clear.       LABS:                        12.2   11.2  )-----------( 165      ( 10 Geo 2019 20:04 )             37.3     01-10    139  |  100  |  31<H>  ----------------------------<  116<H>  4.4   |  22  |  1.21    Ca    8.8      10 Geo 2019 20:04            RADIOLOGY & ADDITIONAL TESTS:    ASSESSMENT: 73yMale w/ BPH s/p Laparoscopic Suprapubic Prostatectomy (01/10). Patient is VSS, HdS, and afebrile.       PLAN:  Diet: clears  Pain control  Monitor ELA drain  Monitor Urine Output  DVT ppx  Cont Abx: Cefazolin  OOB/IS
INTERVAL HPI/OVERNIGHT EVENTS:  Patient had sudden onset of Afib overnight.   resident on call notified and patient was worked up drawing CBC, Troponin, BMP.  Cardiology consulted and left Recs, as well as PLacement of NGT. Patient tolerated NGT and was attached to suction.  Patient transferred to Telemetry floor safely. Improvement noted to abdominal size and distension.     CARDS RECS:  - If pt able to tolerate PO would start Lopressor 12.5mg PO BID and uptitrate as BP/HR allow, goal HR<110. If unable to tolerate can start lopressor 5mg IVP q6h for now (may need inc dosing)  - Assess and treat any underlying factors which may be increasing sympathetic drive (i.e. pain, fever, dehydration 2/2 ileus)  - Check TSH, monitor lytes, keep K>4, Mg>2  - Would repeat EKG in AM once rate better controlled  - Nonurgent TTE to assess for underlying structural heart dz  - Pt w/o anginal sx, EKG w/o acute ischemic changes, Trop T 0.01 -> unlikely ACS. No need to further trend CE unless pt develops new sx or HD instability  - Review of pre-op clearance documents "chronic ischemic heart disease" but pt denies this dx. Please get documentation of prev outpt cards (Dr. Jose Owens) evaluation  - CHADSVASC 2, annual CVA risk ~2.2% annually. No urgent need for A/C, however will need to d/w pt starting long term oral A/C (possibly DOAC) when acceptable from surgical perspective      VITALS:    T(F): 98.8 (01-16-19 @ 03:48), Max: 99.1 (01-15-19 @ 23:59)  HR: 120 (01-16-19 @ 03:48) (91 - 152)  BP: 118/74 (01-16-19 @ 03:48) (109/74 - 155/76)  RR: 16 (01-16-19 @ 03:48) (14 - 17)  SpO2: 93% (01-16-19 @ 03:48) (93% - 98%)  Wt(kg): --    I&O's Detail    14 Jan 2019 07:01  -  15 Geo 2019 07:00  --------------------------------------------------------  IN:    Oral Fluid: 360 mL  Total IN: 360 mL    OUT:    Indwelling Catheter - Urethral: 1650 mL  Total OUT: 1650 mL    Total NET: -1290 mL      15 Geo 2019 07:01  -  16 Jan 2019 04:44  --------------------------------------------------------  IN:    lactated ringers.: 500 mL    Oral Fluid: 700 mL    Packed Red Blood Cells: 50 mL  Total IN: 1250 mL    OUT:    Indwelling Catheter - Urethral: 975 mL  Total OUT: 975 mL    Total NET: 275 mL          MEDICATIONS:    ANTIBIOTICS:      PAIN CONTROL:  acetaminophen   Tablet .. 650 milliGRAM(s) Oral every 6 hours PRN  ondansetron Injectable 4 milliGRAM(s) IV Push every 6 hours PRN       MEDS:      HEME/ONC  heparin  Injectable 5000 Unit(s) SubCutaneous every 8 hours        PHYSICAL EXAM:  General: No acute distress.  Alert and Oriented  Abdominal Exam: Abdomen distended, no TTP.   Exam: Mtz cath in place draining clear/yellow.       LABS:                        11.5   8.4   )-----------( 272      ( 15 Geo 2019 22:10 )             34.3     01-15    136  |  95<L>  |  17  ----------------------------<  126<H>  4.4   |  24  |  0.68    Ca    8.7      15 Geo 2019 22:10  Phos  3.1     01-15  Mg     1.9     01-15            RADIOLOGY & ADDITIONAL TESTS:    ASSESSMENT: 73yMale w/ BPH s/p Lap Suprapubic prostatectomy (1/10).     PLAN:  Diet: NPO  Pain control/naussea control.  Monitor Urine Output  Monitor NGT output.  DVT ppx  Bowel Regimen  OOB/IS
 AM Note    No acute events overnight.  Bladder spasms mostly controlled.      Vital Signs Last 24 Hrs  T(C): 37.4 (01-12-19 @ 21:38), Max: 37.4 (01-12-19 @ 21:38)  T(F): 99.3 (01-12-19 @ 21:38), Max: 99.3 (01-12-19 @ 21:38)  HR: 74 (01-13-19 @ 00:19) (74 - 96)  BP: 119/59 (01-13-19 @ 00:19) (99/54 - 129/60)  BP(mean): 85 (01-13-19 @ 00:19) (72 - 87)  RR: 18 (01-13-19 @ 00:19) (18 - 18)  SpO2: 96% (01-13-19 @ 00:19) (95% - 96%)     12 Jan 2019 05:51    140    |  104    |  20     ----------------------------<  103    4.0     |  27     |  0.88     Ca    8.3        12 Jan 2019 05:51  Phos  2.3       12 Jan 2019 05:51  Mg     2.0       12 Jan 2019 05:51                            9.0    6.1   )-----------( 135      ( 12 Jan 2019 05:49 )             27.6         I&O's Summary    11 Jan 2019 07:01  -  12 Jan 2019 07:00  --------------------------------------------------------  IN: 18843 mL / OUT: 73005 mL / NET: 5540 mL    12 Jan 2019 07:01  -  13 Jan 2019 04:47  --------------------------------------------------------  IN: 37048 mL / OUT: 07224 mL / NET: -3353 mL          PHYSICAL EXAM:    GEN: AAOx3, NAD  ABD: soft, appropriately tender ,ELA with serosanguinous outptu  : 24F 3way rawls in place, on CBI, urine otuput clear light pink
 AM Note    No acute events overnight.  No N/V/D/F.      Vital Signs Last 24 Hrs  T(C): 36.7 (01-11-19 @ 21:24), Max: 36.7 (01-11-19 @ 21:24)  T(F): 98 (01-11-19 @ 21:24), Max: 98 (01-11-19 @ 21:24)  HR: 74 (01-11-19 @ 20:15) (68 - 96)  BP: 106/53 (01-11-19 @ 20:15) (106/53 - 129/58)  BP(mean): 76 (01-11-19 @ 20:15) (76 - 94)  RR: 18 (01-11-19 @ 20:15) (18 - 18)  SpO2: 95% (01-11-19 @ 20:15) (95% - 97%)     11 Jan 2019 05:39    138    |  99     |  32     ----------------------------<  142    4.9     |  23     |  0.90     Ca    8.6        11 Jan 2019 05:39  Phos  4.6       11 Jan 2019 05:39  Mg     1.8       11 Jan 2019 05:39                            10.4   6.8   )-----------( 154      ( 11 Jan 2019 05:39 )             31.8         I&O's Summary    10 Geo 2019 07:01  -  11 Jan 2019 07:00  --------------------------------------------------------  IN: 05525 mL / OUT: 13287 mL / NET: 5930 mL    11 Jan 2019 07:01  -  12 Jan 2019 02:54  --------------------------------------------------------  IN: 42935 mL / OUT: 7530 mL / NET: 9190 mL          PHYSICAL EXAM:    GEN: NAD, AAOx3  ABD: soft, no guarding or rigidity.  ELA with serosanguinous output, dressing saturated.  : 24F 3way rawls in place, CBI running, urine output clear to light pink
Chief Complaint/Reason for Consult: afib  INTERVAL HPI: comfortable nad o/v no palp no syncope  	  MEDICATIONS:  metoprolol tartrate 12.5 milliGRAM(s) Oral two times a day        acetaminophen   Tablet .. 650 milliGRAM(s) Oral every 6 hours PRN  ondansetron Injectable 4 milliGRAM(s) IV Push every 6 hours PRN    docusate sodium 100 milliGRAM(s) Oral three times a day  senna 1 Tablet(s) Oral at bedtime    atorvastatin 20 milliGRAM(s) Oral at bedtime  finasteride 5 milliGRAM(s) Oral daily    benzocaine 15 mG/menthol 3.6 mG Lozenge 1 Lozenge Oral every 3 hours PRN  heparin  Injectable 5000 Unit(s) SubCutaneous every 8 hours  lactated ringers. 1000 milliLiter(s) IV Continuous <Continuous>  lidocaine 2% Gel 1 Application(s) Topical every 6 hours PRN      REVIEW OF SYSTEMS:  [x] As per HPI  CONSTITUTIONAL: No fever, weight loss, or fatigue  RESPIRATORY: No cough, wheezing, chills or hemoptysis; No Shortness of Breath  CARDIOVASCULAR: No chest pain, palpitations, dizziness, or leg swelling  GASTROINTESTINAL: No abdominal or epigastric pain. No nausea, vomiting, or hematemesis; No diarrhea or constipation. No melena or hematochezia.  MUSCULOSKELETAL: No joint pain or swelling; No muscle, back, or extremity pain  [x] All others negative	  [ ] Unable to obtain    PHYSICAL EXAM:  T(C): 36.3 (01-17-19 @ 08:58), Max: 36.8 (01-16-19 @ 14:24)  HR: 74 (01-17-19 @ 08:58) (74 - 93)  BP: 140/63 (01-17-19 @ 08:58) (128/64 - 140/63)  RR: 17 (01-17-19 @ 08:58) (15 - 17)  SpO2: 96% (01-17-19 @ 08:58) (95% - 99%)  Wt(kg): --  I&O's Summary    16 Jan 2019 07:01  -  17 Jan 2019 07:00  --------------------------------------------------------  IN: 2200 mL / OUT: 675 mL / NET: 1525 mL    17 Jan 2019 07:01  -  17 Jan 2019 12:31  --------------------------------------------------------  IN: 400 mL / OUT: 400 mL / NET: 0 mL          Appearance: Normal	  HEENT:   Normal oral mucosa  Cardiovascular: Normal S1 S2, No JVD, No murmurs, No edema  Respiratory: Lungs clear to auscultation	  Gastrointestinal:  Soft, Non-tender, + BS	  Extremities: Normal range of motion, No clubbing, cyanosis or edema  Vascular: Peripheral pulses palpable 2+ bilaterally    TELEMETRY: 	    ECG:   	  RADIOLOGY:   CXR:  CT:  US:    CARDIAC TESTING:  Echocardiogram: < from: Echocardiogram (01.16.19 @ 13:29) >  Normal left ventricular size and wall thickness.Probably normal left   ventricular wall motion.The left ventricular wall motion is normal.The   left   atrial size is normal.Right atrial size is normal.The right ventricle is   normal in size and function.Structurally normal aortic valve.No aortic   regurgitation noted.Structurally normal mitral valve.There is trace   mitral   regurgitation.Structurally normal tricuspid valve.No tricuspid   regurgitation   noted.Structurally normal pulmonic valve.No pulmonic regurgitation   noted.No   aortic root dilatation.A trivial pericardial effusion noted.    < end of copied text >    Catheterization:  Stress Test:      LABS:	 	    CARDIAC MARKERS:                                  9.6    8.5   )-----------( 327      ( 17 Jan 2019 06:35 )             29.5     01-17    139  |  99  |  22  ----------------------------<  90  4.3   |  27  |  0.76    Ca    8.6      17 Jan 2019 06:35  Phos  2.8     01-17  Mg     2.2     01-17      proBNP:   Lipid Profile:   HgA1c:   TSH:     ASSESSMENT/PLAN: 	    # AFib - SR now,  HR at goal on mtp 12.5 bid can dc on Toprol XL 12.5 q daily  no structural heart disease on echo  CHADSVASC 2 - guideline based therapy would incluide eliquis 5bid, however the risk factors are not strong predictors (simply age) there fore ASA 81 may be okay for now,   if okay with urology can armand [facility]  will see in office in 1-2 weeks      #CAD     #HTN     #CV Prevention -   q3mo Fasting Lipid Profile, Goal LDL<100, statin as tolerated.  q6week TSH check  q3mo 25-OHD Vitamin D Level, Goal 50, supplement as tolerated
Chief Complaint/Reason for Consult: afib  INTERVAL HPI: exam sinus rhythm, no syncope no palp o/n  	  MEDICATIONS:  metoprolol tartrate 12.5 milliGRAM(s) Oral two times a day        acetaminophen   Tablet .. 650 milliGRAM(s) Oral every 6 hours PRN  ondansetron Injectable 4 milliGRAM(s) IV Push every 6 hours PRN    docusate sodium 100 milliGRAM(s) Oral three times a day  senna 1 Tablet(s) Oral at bedtime    atorvastatin 20 milliGRAM(s) Oral at bedtime  finasteride 5 milliGRAM(s) Oral daily    benzocaine 15 mG/menthol 3.6 mG Lozenge 1 Lozenge Oral every 3 hours PRN  heparin  Injectable 5000 Unit(s) SubCutaneous every 8 hours  lidocaine 2% Gel 1 Application(s) Topical every 6 hours PRN  sodium chloride 0.9% lock flush 3 milliLiter(s) IV Push every 8 hours      REVIEW OF SYSTEMS:  [x] As per HPI  CONSTITUTIONAL: No fever, weight loss, or fatigue  RESPIRATORY: No cough, wheezing, chills or hemoptysis; No Shortness of Breath  CARDIOVASCULAR: No chest pain, palpitations, dizziness, or leg swelling  GASTROINTESTINAL: No abdominal or epigastric pain. No nausea, vomiting, or hematemesis; No diarrhea or constipation. No melena or hematochezia.  MUSCULOSKELETAL: No joint pain or swelling; No muscle, back, or extremity pain  [x] All others negative	  [ ] Unable to obtain    PHYSICAL EXAM:  T(C): 36.3 (01-18-19 @ 08:57), Max: 36.7 (01-17-19 @ 20:45)  HR: 70 (01-18-19 @ 08:57) (70 - 87)  BP: 124/72 (01-18-19 @ 08:57) (124/60 - 138/67)  RR: 17 (01-18-19 @ 08:57) (16 - 17)  SpO2: 98% (01-18-19 @ 08:57) (95% - 98%)  Wt(kg): --  I&O's Summary    17 Jan 2019 07:01  -  18 Jan 2019 07:00  --------------------------------------------------------  IN: 2500 mL / OUT: 3680 mL / NET: -1180 mL    18 Jan 2019 07:01  -  18 Jan 2019 13:35  --------------------------------------------------------  IN: 0 mL / OUT: 280 mL / NET: -280 mL          Appearance: Normal	  HEENT:   Normal oral mucosa  Cardiovascular: Normal S1 S2, No JVD, No murmurs, No edema  Respiratory: Lungs clear to auscultation	  Gastrointestinal:  Soft, Non-tender, + BS	  Extremities: Normal range of motion, No clubbing, cyanosis or edema  Vascular: Peripheral pulses palpable 2+ bilaterally    TELEMETRY: 	    ECG:   	  RADIOLOGY:   CXR:  CT:  US:    CARDIAC TESTING:  Echocardiogram:  Catheterization:  Stress Test:      LABS:	 	    CARDIAC MARKERS:                                  9.6    6.4   )-----------( 306      ( 18 Jan 2019 06:34 )             29.8     01-18    137  |  98  |  13  ----------------------------<  90  3.8   |  25  |  0.67    Ca    8.5      18 Jan 2019 06:33  Phos  3.1     01-18  Mg     2.2     01-18      proBNP:   Lipid Profile:   HgA1c:   TSH:     ASSESSMENT/PLAN: 	    # AFib - SR now,  HR at goal on mtp 12.5 bid can dc on Toprol XL 12.5 q daily  no structural heart disease on echo  CHADSVASC 2 - guideline based therapy would include eliquis 5bid, however the risk factors are not strong predictors (age) there fore ASA 81 may be okay for now,   if okay with urology can initiate AC  will see in office in 1-2 weeks
INTERVAL HPI/OVERNIGHT EVENTS:  No acute events overnight.    VITALS:    T(F): 97.4 (01-17-19 @ 05:10), Max: 98.6 (01-16-19 @ 07:50)  HR: 83 (01-17-19 @ 05:10) (76 - 112)  BP: 138/64 (01-17-19 @ 05:10) (128/64 - 138/64)  RR: 16 (01-17-19 @ 05:10) (15 - 17)  SpO2: 99% (01-17-19 @ 05:10) (95% - 99%)  Wt(kg): --    I&O's Detail    15 Geo 2019 07:01  -  16 Jan 2019 07:00  --------------------------------------------------------  IN:    lactated ringers.: 800 mL    Oral Fluid: 700 mL    Packed Red Blood Cells: 50 mL  Total IN: 1550 mL    OUT:    Indwelling Catheter - Urethral: 1300 mL    Nasoenteral Tube: 100 mL  Total OUT: 1400 mL    Total NET: 150 mL      16 Jan 2019 07:01  -  17 Jan 2019 05:46  --------------------------------------------------------  IN:    lactated ringers.: 1900 mL  Total IN: 1900 mL    OUT:    Indwelling Catheter - Urethral: 550 mL  Total OUT: 550 mL    Total NET: 1350 mL          MEDICATIONS:    ANTIBIOTICS:      PAIN CONTROL:  acetaminophen   Tablet .. 650 milliGRAM(s) Oral every 6 hours PRN  ondansetron Injectable 4 milliGRAM(s) IV Push every 6 hours PRN       MEDS:      HEME/ONC  heparin  Injectable 5000 Unit(s) SubCutaneous every 8 hours        PHYSICAL EXAM:  General: No acute distress.  Alert and Oriented  Abdominal Exam: Distended, non tender. Incision site c/d/i.   Exam:      LABS:                        11.2   8.2   )-----------( 283      ( 16 Jan 2019 05:50 )             34.0     01-16    135  |  94<L>  |  17  ----------------------------<  116<H>  3.7   |  27  |  0.65    Ca    8.6      16 Jan 2019 05:50  Phos  3.1     01-16  Mg     2.6     01-16            RADIOLOGY & ADDITIONAL TESTS:    ASSESSMENT: 73yMale w/BPH s/p Suprapubic prostatectomy (01/10)    PLAN:  Diet: NPO  Pain control  Monitor Urine Output  DVT ppx  OOB/IS
INTERVAL HPI/OVERNIGHT EVENTS:  No acute events overnight.    VITALS:    T(F): 97.9 (01-14-19 @ 05:09), Max: 98.1 (01-13-19 @ 21:35)  HR: 88 (01-14-19 @ 05:36) (72 - 90)  BP: 144/65 (01-14-19 @ 05:36) (114/75 - 144/65)  RR: 16 (01-14-19 @ 05:36) (16 - 18)  SpO2: 95% (01-14-19 @ 05:36) (95% - 96%)  Wt(kg): --    I&O's Detail    12 Jan 2019 07:01  -  13 Jan 2019 07:00  --------------------------------------------------------  IN:    Continuous Bladder Irrigation: 70664 mL    lactated ringers.: 1200 mL  Total IN: 19645 mL    OUT:    Continuous Bladder Irrigation: 25109 mL    Drain: 30.5 mL  Total OUT: 61586.5 mL    Total NET: -2855.5 mL      13 Jan 2019 07:01  -  14 Jan 2019 06:24  --------------------------------------------------------  IN:    lactated ringers.: 1000 mL  Total IN: 1000 mL    OUT:    Continuous Bladder Irrigation: 1300 mL    Drain: 5 mL    Indwelling Catheter - Urethral: 1200 mL    Voided: 1950 mL  Total OUT: 4455 mL    Total NET: -3455 mL          MEDICATIONS:    ANTIBIOTICS:      PAIN CONTROL:  acetaminophen   Tablet .. 1000 milliGRAM(s) Oral every 6 hours  belladonna 16.2 mG/opium 60 mg Suppository 1 Suppository(s) Rectal every 8 hours  diphenhydrAMINE 25 milliGRAM(s) Oral at bedtime PRN  HYDROmorphone   Tablet 4 milliGRAM(s) Oral every 4 hours PRN  HYDROmorphone   Tablet 2 milliGRAM(s) Oral every 4 hours PRN  ondansetron Injectable 4 milliGRAM(s) IV Push every 6 hours PRN       MEDS:  oxybutynin 5 milliGRAM(s) Oral every 8 hours      HEME/ONC  heparin  Injectable 5000 Unit(s) SubCutaneous every 8 hours        PHYSICAL EXAM:  General: No acute distress.  Alert and Oriented  Abdominal Exam: soft, incision sites clean and dry, ELA intact serosanguinous   Exam: FC intact, urine light pink (OFF CBI)      LABS:                        9.3    7.1   )-----------( 156      ( 13 Jan 2019 05:57 )             28.7     01-13    142  |  103  |  9   ----------------------------<  87  3.8   |  28  |  0.63    Ca    8.4      13 Jan 2019 05:57  Phos  2.1     01-13  Mg     1.9     01-13            RADIOLOGY & ADDITIONAL TESTS:
INTERVAL HPI/OVERNIGHT EVENTS:  No acute events overnight.    VITALS:    T(F): 98 (01-15-19 @ 04:55), Max: 98.5 (01-14-19 @ 14:42)  HR: 98 (01-15-19 @ 04:55) (85 - 98)  BP: 155/76 (01-15-19 @ 04:55) (132/66 - 155/76)  RR: 16 (01-15-19 @ 04:55) (16 - 18)  SpO2: 95% (01-15-19 @ 04:55) (95% - 97%)  Wt(kg): --    I&O's Detail    14 Jan 2019 07:01  -  15 Geo 2019 07:00  --------------------------------------------------------  IN:    Oral Fluid: 360 mL  Total IN: 360 mL    OUT:    Indwelling Catheter - Urethral: 1650 mL  Total OUT: 1650 mL    Total NET: -1290 mL          MEDICATIONS:    ANTIBIOTICS:      PAIN CONTROL:  acetaminophen   Tablet .. 650 milliGRAM(s) Oral every 6 hours PRN  ondansetron Injectable 4 milliGRAM(s) IV Push every 6 hours PRN       MEDS:      HEME/ONC  heparin  Injectable 5000 Unit(s) SubCutaneous every 8 hours        PHYSICAL EXAM:  General: No acute distress.  Alert and Oriented  Abdominal Exam: soft nt/nd,   Exam: Mtz cath in place draining well.       LABS:                        10.5   7.1   )-----------( 226      ( 15 Geo 2019 06:00 )             32.1     01-15    137  |  96  |  12  ----------------------------<  96  4.2   |  27  |  0.75    Ca    8.7      15 Geo 2019 06:00  Phos  3.2     01-15  Mg     2.2     01-15            RADIOLOGY & ADDITIONAL TESTS:    ASSESSMENT: 73yMale w/ BPH s/p Suprapubic prostatectomy (01/10/2019).      PLAN:  Diet: clears  Pain control  Monitor Urine Output  DVT ppx  OOB/IS
INTERVAL HPI/OVERNIGHT EVENTS:  No acute events overnight. +BM +flatus    VITALS:    T(F): 97.7 (01-18-19 @ 00:41), Max: 98 (01-17-19 @ 20:45)  HR: 87 (01-18-19 @ 00:41) (74 - 96)  BP: 124/63 (01-18-19 @ 00:41) (124/63 - 155/70)  RR: 16 (01-18-19 @ 00:41) (15 - 17)  SpO2: 95% (01-18-19 @ 00:41) (95% - 98%)  Wt(kg): --    I&O's Detail    16 Jan 2019 07:01  -  17 Jan 2019 07:00  --------------------------------------------------------  IN:    lactated ringers.: 2200 mL  Total IN: 2200 mL    OUT:    Indwelling Catheter - Urethral: 675 mL  Total OUT: 675 mL    Total NET: 1525 mL      17 Jan 2019 07:01  -  18 Jan 2019 05:13  --------------------------------------------------------  IN:    lactated ringers.: 1500 mL    Oral Fluid: 150 mL  Total IN: 1650 mL    OUT:    Indwelling Catheter - Urethral: 2580 mL  Total OUT: 2580 mL    Total NET: -930 mL          MEDICATIONS:    ANTIBIOTICS:      PAIN CONTROL:  acetaminophen   Tablet .. 650 milliGRAM(s) Oral every 6 hours PRN  ondansetron Injectable 4 milliGRAM(s) IV Push every 6 hours PRN       MEDS:      HEME/ONC  heparin  Injectable 5000 Unit(s) SubCutaneous every 8 hours        PHYSICAL EXAM:  General: No acute distress.  Alert and Oriented  Abdominal Exam: slightly distended, soft, mild discomfort bilat   Exam: FC intact, urine clear      LABS:                        9.6    8.5   )-----------( 327      ( 17 Jan 2019 06:35 )             29.5     01-17    139  |  99  |  22  ----------------------------<  90  4.3   |  27  |  0.76    Ca    8.6      17 Jan 2019 06:35  Phos  2.8     01-17  Mg     2.2     01-17            RADIOLOGY & ADDITIONAL TESTS:
Notified by PA that patient was in rapid AFib w/HR in 150s, BP stable.  Seen at bedside, he denies any chest pain, shortness of breath or palpitation.  Cardiac panel sent, troponin negative, CBC/BMP unremarkable.   Will consult cardiology and transfer to a telemetry floor, awaiting recs.    Also noted to have worsening abdominal distention throughout the day and an episode of non-bloody, non-bilious emesis.   Currently complaints of nausea, but feels better, had a BM around 5pm.  Abdomen is softly distended but nontender, without guarding or rebound.  AXR consistent with severe ileus  NGT placed, patient tolerated the procedure well, 200cc of brown fluid evacuated immediately.  CXR confirms placement of NGT under diaphragm  d/w Dr. Joyce
UROLOGY POST OP NOTE (PAGER # [phone])    PROCEDURE: Lap suprapubic prostatectomy (01/10/2019).         Patient seen and assessed at bedside post op.  Patient denies n/v, chest pain, SOB, severe abd pain.     T(C): 34.9 (01-10-19 @ 22:20), Max: 36.8 (01-10-19 @ 21:15)  HR: 78 (01-10-19 @ 22:20) (70 - 80)  BP: 117/61 (01-10-19 @ 22:20) (97/52 - 135/79)  RR: 17 (01-10-19 @ 22:20) (11 - 21)  SpO2: 99% (01-10-19 @ 22:20) (97% - 100%)  Wt(kg): --    ON PE:    Abdomen: Appropriate tender, soft. Incision sites C/D/I. ELA drain in place w/ serosanguinous output.    : Mtz cath in place draining blood tinged output.                           12.2   11.2  )-----------( 165      ( 10 Ego 2019 20:04 )             37.3     01-10    139  |  100  |  31<H>  ----------------------------<  116<H>  4.4   |  22  |  1.21    Ca    8.8      10 Geo 2019 20:04        A/P:

## 2019-01-22 DIAGNOSIS — I48.91 UNSPECIFIED ATRIAL FIBRILLATION: ICD-10-CM

## 2019-01-22 DIAGNOSIS — N13.8 OTHER OBSTRUCTIVE AND REFLUX UROPATHY: ICD-10-CM
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
## 2019-01-22 DIAGNOSIS — N40.1 BENIGN PROSTATIC HYPERPLASIA WITH LOWER URINARY TRACT SYMPTOMS: ICD-10-CM

## 2019-01-22 DIAGNOSIS — K56.7 ILEUS, UNSPECIFIED: ICD-10-CM

## 2019-01-22 DIAGNOSIS — E66.9 OBESITY, UNSPECIFIED: ICD-10-CM

## 2019-01-22 DIAGNOSIS — I10 ESSENTIAL (PRIMARY) HYPERTENSION: ICD-10-CM

## 2019-01-22 DIAGNOSIS — R31.0 GROSS HEMATURIA: ICD-10-CM
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
## 2019-01-22 DIAGNOSIS — K91.89 OTHER POSTPROCEDURAL COMPLICATIONS AND DISORDERS OF DIGESTIVE SYSTEM: ICD-10-CM

## 2019-01-22 DIAGNOSIS — I25.10 ATHEROSCLEROTIC HEART DISEASE OF NATIVE CORONARY ARTERY WITHOUT ANGINA PECTORIS: ICD-10-CM

## 2019-01-23 ENCOUNTER — APPOINTMENT (OUTPATIENT)
Dept: UROLOGY | Facility: CLINIC | Age: 74
End: 2019-01-23
Payer: MEDICARE

## 2019-01-23 VITALS
WEIGHT: 229 LBS | HEIGHT: 73 IN | TEMPERATURE: 97.4 F | SYSTOLIC BLOOD PRESSURE: 129 MMHG | HEART RATE: 86 BPM | DIASTOLIC BLOOD PRESSURE: 74 MMHG | BODY MASS INDEX: 30.35 KG/M2

## 2019-01-23 PROCEDURE — 99024 POSTOP FOLLOW-UP VISIT: CPT

## 2019-01-23 NOTE — HISTORY OF PRESENT ILLNESS
[FreeTextEntry1] : Patient doing well post rSPP\par Strong stream\par Clear/pink-yellow urine\par No clots\par Passing BM and flatus\par Incisions all c/d/i on exam; healing nicely\par No SOB/CP, calf swelling\par Pathology: 184 grams of benign tissue, inflammation\par Plan for follow up and continuation of care with Dr. Cheung, his urologist. \par

## 2019-03-11 ENCOUNTER — TRANSCRIPTION ENCOUNTER (OUTPATIENT)
Age: 74
End: 2019-03-11

## 2019-03-12 ENCOUNTER — RX RENEWAL (OUTPATIENT)
Age: 74
End: 2019-03-12

## 2019-04-17 NOTE — PHYSICAL THERAPY INITIAL EVALUATION ADULT - STANDING BALANCE: DYNAMIC, REHAB EVAL
dilip pena  Primary Care Physician: Lex Molina fair balance

## 2019-05-20 ENCOUNTER — APPOINTMENT (OUTPATIENT)
Dept: UROLOGY | Facility: CLINIC | Age: 74
End: 2019-05-20
Payer: MEDICARE

## 2019-05-20 VITALS — HEART RATE: 56 BPM | SYSTOLIC BLOOD PRESSURE: 115 MMHG | TEMPERATURE: 99.1 F | DIASTOLIC BLOOD PRESSURE: 60 MMHG

## 2019-05-20 DIAGNOSIS — R97.20 ELEVATED PROSTATE, SPECIFIC ANTIGEN [PSA]: ICD-10-CM

## 2019-05-20 PROCEDURE — 99213 OFFICE O/P EST LOW 20 MIN: CPT | Mod: 25

## 2019-05-20 PROCEDURE — 51798 US URINE CAPACITY MEASURE: CPT

## 2019-05-20 RX ORDER — FINASTERIDE 5 MG/1
5 TABLET, FILM COATED ORAL
Refills: 0 | Status: DISCONTINUED | COMMUNITY
End: 2019-05-20

## 2019-05-20 NOTE — ASSESSMENT
[FreeTextEntry1] : 74yo male with history of a very enlarged prostate, elevated but stable PSA with several prior negative biopsies\par S/p robotic simple prostatectomy 1/2019 with Dr. Joyce\par Doing well\par PSA improved\par Can stop finasteride now \par Stop tamsulosin in 1 month if no change in symptoms\par F/u with PCP and Cardiology

## 2019-05-20 NOTE — HISTORY OF PRESENT ILLNESS
[FreeTextEntry1] : This is a 72yo male with a complicated urological history referred for a recent episode of gross hematuria. The hematuria occurred about 1 week ago. He was diagnosed with a UTI and treated with antibiotics although urine culture is negative. His hematuria has resolved. He reports baseline nocturia 2-3x a night and urinary frequency. He has been on tamsulosin 0.8mg daily and finasteride for several years. He has a long standing history of BPH with a very large prostate (> 250gm) and elevated PSA in 5-10 range over the last several years. He has undergone several prior prostate biopsies, all of which have been negative. The last biopsy was a few years ago. A MRI from last year shows a questionable PI-RADS 4 lesion but this is not clear on the report. A CT scan in December performed for hematuria is negative for renal or bladder abnormalities. It does show a hepatic mass which was determined to be benign on follow up liver imaging. \par \par 9/11/17 Here for f/u. Had negative cystoscopy at last visit. Reports episode of gross hematuria 2 weeks ago that resolved in 4 days. Hematuria was mild, no significant clots or difficulty urinating. \par \par 10/18/17 Here for f/u. Recent PSA = 6.4 which is down from previous levels. Had CT urogram for hematuria which is unremarkable. Urine culture, cytology negative. Voiding symptoms stable, mildly bothersome.\par \par 5/20/19 Here for f/u. Underwent robotic suprapubic prostatectomy 1/2019 with Dr. Joyce. Path benign. Recent PSA = 1.8.  [Urinary Frequency] : urinary frequency [Nocturia] : nocturia [Weak Stream] : weak stream [None] : None

## 2019-05-20 NOTE — PHYSICAL EXAM
[General Appearance - Well Developed] : well developed [General Appearance - Well Nourished] : well nourished [Normal Appearance] : normal appearance [Well Groomed] : well groomed [General Appearance - In No Acute Distress] : no acute distress [Abdomen Soft] : soft [Abdomen Tenderness] : non-tender [Costovertebral Angle Tenderness] : no ~M costovertebral angle tenderness [FreeTextEntry1] : PVR = 127cc [Oriented To Time, Place, And Person] : oriented to person, place, and time [Affect] : the affect was normal [Mood] : the mood was normal [Not Anxious] : not anxious [Normal Station and Gait] : the gait and station were normal for the patient's age [No Focal Deficits] : no focal deficits

## 2019-05-20 NOTE — LETTER BODY
[Dear  ___] : Dear  [unfilled], [Courtesy Letter:] : I had the pleasure of seeing your patient, [unfilled], in my office today. [Please see my note below.] : Please see my note below. [Consult Closing:] : Thank you very much for allowing me to participate in the care of this patient.  If you have any questions, please do not hesitate to contact me. [Sincerely,] : Sincerely, [FreeTextEntry2] : Cole Crews, \par [address]\[place] [address] [FreeTextEntry3] : Zoltan Cheung MD\par Urologic Oncology\par Department of Urology\par [facility]\par \par [employer] [facility] \par \par

## 2019-10-02 ENCOUNTER — APPOINTMENT (OUTPATIENT)
Dept: UROLOGY | Facility: CLINIC | Age: 74
End: 2019-10-02
Payer: MEDICARE

## 2019-10-02 VITALS — DIASTOLIC BLOOD PRESSURE: 69 MMHG | TEMPERATURE: 98.4 F | SYSTOLIC BLOOD PRESSURE: 125 MMHG | HEART RATE: 53 BPM

## 2019-10-02 PROCEDURE — 51798 US URINE CAPACITY MEASURE: CPT

## 2019-10-02 PROCEDURE — 99213 OFFICE O/P EST LOW 20 MIN: CPT | Mod: 25

## 2019-10-02 NOTE — ASSESSMENT
[FreeTextEntry1] : 74yo male with history of a very enlarged prostate, elevated but stable PSA with several prior negative biopsies\par S/p robotic simple prostatectomy 1/2019 with Dr. Joyce\par Stopped finasteride and tamsulosin\par Recent gross hematuria. Unclear if he had UTI because he was on antibiotics when urine was tested\par Normal residual today\par Recommended hematuria workup with CT urogram, cytology and cystoscopy\par He would like to defer cystoscopy for now\par F/u after CT

## 2019-10-02 NOTE — PHYSICAL EXAM
[General Appearance - Well Developed] : well developed [General Appearance - Well Nourished] : well nourished [Normal Appearance] : normal appearance [General Appearance - In No Acute Distress] : no acute distress [Well Groomed] : well groomed [Abdomen Soft] : soft [Costovertebral Angle Tenderness] : no ~M costovertebral angle tenderness [Abdomen Tenderness] : non-tender [Oriented To Time, Place, And Person] : oriented to person, place, and time [Affect] : the affect was normal [Mood] : the mood was normal [Not Anxious] : not anxious [Normal Station and Gait] : the gait and station were normal for the patient's age [No Focal Deficits] : no focal deficits [FreeTextEntry1] : PVR = 21cc

## 2019-10-02 NOTE — LETTER BODY
[Dear  ___] : Dear  [unfilled], [Courtesy Letter:] : I had the pleasure of seeing your patient, [unfilled], in my office today. [Consult Closing:] : Thank you very much for allowing me to participate in the care of this patient.  If you have any questions, please do not hesitate to contact me. [Please see my note below.] : Please see my note below. [Sincerely,] : Sincerely, [FreeTextEntry2] : Cole Crews, \par [address]\[place] [address] [FreeTextEntry3] : Zoltan Cheung MD\par Urologic Oncology\par Department of Urology\par [facility]\par \par [employer] [facility] \par \par

## 2019-10-02 NOTE — HISTORY OF PRESENT ILLNESS
[Urinary Frequency] : urinary frequency [Nocturia] : nocturia [Weak Stream] : weak stream [None] : None [FreeTextEntry1] : This is a 72yo male with a complicated urological history referred for a recent episode of gross hematuria. The hematuria occurred about 1 week ago. He was diagnosed with a UTI and treated with antibiotics although urine culture is negative. His hematuria has resolved. He reports baseline nocturia 2-3x a night and urinary frequency. He has been on tamsulosin 0.8mg daily and finasteride for several years. He has a long standing history of BPH with a very large prostate (> 250gm) and elevated PSA in 5-10 range over the last several years. He has undergone several prior prostate biopsies, all of which have been negative. The last biopsy was a few years ago. A MRI from last year shows a questionable PI-RADS 4 lesion but this is not clear on the report. A CT scan in December performed for hematuria is negative for renal or bladder abnormalities. It does show a hepatic mass which was determined to be benign on follow up liver imaging. \par \par 9/11/17 Here for f/u. Had negative cystoscopy at last visit. Reports episode of gross hematuria 2 weeks ago that resolved in 4 days. Hematuria was mild, no significant clots or difficulty urinating. \par \par 10/18/17 Here for f/u. Recent PSA = 6.4 which is down from previous levels. Had CT urogram for hematuria which is unremarkable. Urine culture, cytology negative. Voiding symptoms stable, mildly bothersome.\par \par 5/20/19 Here for f/u. Underwent robotic suprapubic prostatectomy 1/2019 with Dr. Joyce. Path benign. Recent PSA = 1.8. \par \par 10/02/19 Here for f/u. Had episode of hematuria last month, he took antibiotics from home before discussing with physician. U/A performed after antibiotics was negative. No bleeding for the last few days.

## 2019-10-03 LAB
ANION GAP SERPL CALC-SCNC: 11 MMOL/L
BACTERIA UR CULT: NORMAL
BUN SERPL-MCNC: 22 MG/DL
CALCIUM SERPL-MCNC: 9.8 MG/DL
CHLORIDE SERPL-SCNC: 104 MMOL/L
CO2 SERPL-SCNC: 28 MMOL/L
CREAT SERPL-MCNC: 0.79 MG/DL
GLUCOSE SERPL-MCNC: 97 MG/DL
POTASSIUM SERPL-SCNC: 4.8 MMOL/L
SODIUM SERPL-SCNC: 143 MMOL/L
URINE CYTOLOGY: NORMAL

## 2019-11-04 ENCOUNTER — APPOINTMENT (OUTPATIENT)
Dept: UROLOGY | Facility: CLINIC | Age: 74
End: 2019-11-04
Payer: MEDICARE

## 2019-11-04 VITALS — DIASTOLIC BLOOD PRESSURE: 74 MMHG | SYSTOLIC BLOOD PRESSURE: 132 MMHG | HEART RATE: 59 BPM | TEMPERATURE: 98.4 F

## 2019-11-04 PROCEDURE — 99213 OFFICE O/P EST LOW 20 MIN: CPT

## 2019-11-04 RX ORDER — TAMSULOSIN HYDROCHLORIDE 0.4 MG/1
0.4 CAPSULE ORAL TWICE DAILY
Qty: 180 | Refills: 0 | Status: DISCONTINUED | COMMUNITY
Start: 2017-04-27 | End: 2019-11-04

## 2019-11-04 NOTE — LETTER BODY
[Dear  ___] : Dear  [unfilled], [Courtesy Letter:] : I had the pleasure of seeing your patient, [unfilled], in my office today. [Please see my note below.] : Please see my note below. [Consult Closing:] : Thank you very much for allowing me to participate in the care of this patient.  If you have any questions, please do not hesitate to contact me. [Sincerely,] : Sincerely, [FreeTextEntry2] : Cole Crews, \par [address]\[place] [address] [FreeTextEntry3] : Zoltan Cheung MD\par Urologic Oncology\par Department of Urology\par [facility]\par \par [employer] at [facility] \par \par

## 2019-11-04 NOTE — ASSESSMENT
[FreeTextEntry1] : 72yo male with history of a very enlarged prostate, elevated but stable PSA with several prior negative biopsies\par S/p robotic simple prostatectomy 1/2019 with Dr. Joyce\par Stopped finasteride and tamsulosin\par Recent gross hematuria. Unclear if he had UTI because he was on antibiotics when urine was tested\par CT urogram reviewed, unremarkable\par Cytology atypical\par Strongly recommend cystoscopy. He is agreeable and will schedule

## 2019-11-04 NOTE — HISTORY OF PRESENT ILLNESS
[FreeTextEntry1] : This is a 70yo male with a complicated urological history referred for a recent episode of gross hematuria. The hematuria occurred about 1 week ago. He was diagnosed with a UTI and treated with antibiotics although urine culture is negative. His hematuria has resolved. He reports baseline nocturia 2-3x a night and urinary frequency. He has been on tamsulosin 0.8mg daily and finasteride for several years. He has a long standing history of BPH with a very large prostate (> 250gm) and elevated PSA in 5-10 range over the last several years. He has undergone several prior prostate biopsies, all of which have been negative. The last biopsy was a few years ago. A MRI from last year shows a questionable PI-RADS 4 lesion but this is not clear on the report. A CT scan in December performed for hematuria is negative for renal or bladder abnormalities. It does show a hepatic mass which was determined to be benign on follow up liver imaging. \par \par 9/11/17 Here for f/u. Had negative cystoscopy at last visit. Reports episode of gross hematuria 2 weeks ago that resolved in 4 days. Hematuria was mild, no significant clots or difficulty urinating. \par \par 10/18/17 Here for f/u. Recent PSA = 6.4 which is down from previous levels. Had CT urogram for hematuria which is unremarkable. Urine culture, cytology negative. Voiding symptoms stable, mildly bothersome.\par \par 5/20/19 Here for f/u. Underwent robotic suprapubic prostatectomy 1/2019 with Dr. Joyce. Path benign. Recent PSA = 1.8. \par \par 10/02/19 Here for f/u. Had episode of hematuria last month, he took antibiotics from home before discussing with physician. U/A performed after antibiotics was negative. No bleeding for the last few days. \par \par 11/4/19 Here for f/u. Had another episode of hematuria. CT urogram negative. Culture negative. Cytology atypical.  [Urinary Frequency] : urinary frequency [Nocturia] : nocturia [Weak Stream] : weak stream [None] : None

## 2019-11-07 LAB — BACTERIA UR CULT: NORMAL

## 2019-11-11 ENCOUNTER — TRANSCRIPTION ENCOUNTER (OUTPATIENT)
Age: 74
End: 2019-11-11

## 2019-11-12 ENCOUNTER — FORM ENCOUNTER (OUTPATIENT)
Age: 74
End: 2019-11-12

## 2019-11-13 ENCOUNTER — OUTPATIENT (OUTPATIENT)
Dept: OUTPATIENT SERVICES | Facility: HOSPITAL | Age: 74
LOS: 1 days | End: 2019-11-13
Payer: MEDICARE

## 2019-11-13 ENCOUNTER — APPOINTMENT (OUTPATIENT)
Dept: UROLOGY | Facility: CLINIC | Age: 74
End: 2019-11-13
Payer: MEDICARE

## 2019-11-13 VITALS — SYSTOLIC BLOOD PRESSURE: 154 MMHG | DIASTOLIC BLOOD PRESSURE: 72 MMHG | TEMPERATURE: 97.6 F | HEART RATE: 61 BPM

## 2019-11-13 DIAGNOSIS — Z98.890 OTHER SPECIFIED POSTPROCEDURAL STATES: Chronic | ICD-10-CM

## 2019-11-13 PROCEDURE — 71046 X-RAY EXAM CHEST 2 VIEWS: CPT | Mod: 26

## 2019-11-13 PROCEDURE — 71046 X-RAY EXAM CHEST 2 VIEWS: CPT

## 2019-11-13 PROCEDURE — 52000 CYSTOURETHROSCOPY: CPT

## 2019-11-14 LAB
ALBUMIN SERPL ELPH-MCNC: 4.5 G/DL
ALP BLD-CCNC: 67 U/L
ALT SERPL-CCNC: 19 U/L
ANION GAP SERPL CALC-SCNC: 13 MMOL/L
APPEARANCE: CLEAR
APTT BLD: 31.4 SEC
AST SERPL-CCNC: 18 U/L
BACTERIA UR CULT: NORMAL
BACTERIA: NEGATIVE
BASOPHILS # BLD AUTO: 0.07 K/UL
BASOPHILS NFR BLD AUTO: 1 %
BILIRUB SERPL-MCNC: 0.6 MG/DL
BILIRUBIN URINE: NEGATIVE
BLOOD URINE: NEGATIVE
BUN SERPL-MCNC: 26 MG/DL
CALCIUM SERPL-MCNC: 9.6 MG/DL
CHLORIDE SERPL-SCNC: 102 MMOL/L
CO2 SERPL-SCNC: 27 MMOL/L
COLOR: YELLOW
CREAT SERPL-MCNC: 0.91 MG/DL
EOSINOPHIL # BLD AUTO: 0.19 K/UL
EOSINOPHIL NFR BLD AUTO: 2.7 %
GLUCOSE QUALITATIVE U: NEGATIVE
GLUCOSE SERPL-MCNC: 98 MG/DL
HCT VFR BLD CALC: 48.7 %
HGB BLD-MCNC: 15.5 G/DL
HYALINE CASTS: 1 /LPF
IMM GRANULOCYTES NFR BLD AUTO: 0.4 %
INR PPP: 1.03 RATIO
KETONES URINE: NEGATIVE
LEUKOCYTE ESTERASE URINE: NEGATIVE
LYMPHOCYTES # BLD AUTO: 3.22 K/UL
LYMPHOCYTES NFR BLD AUTO: 46.3 %
MAN DIFF?: NORMAL
MCHC RBC-ENTMCNC: 29.7 PG
MCHC RBC-ENTMCNC: 31.8 GM/DL
MCV RBC AUTO: 93.3 FL
MICROSCOPIC-UA: NORMAL
MONOCYTES # BLD AUTO: 0.76 K/UL
MONOCYTES NFR BLD AUTO: 10.9 %
NEUTROPHILS # BLD AUTO: 2.68 K/UL
NEUTROPHILS NFR BLD AUTO: 38.7 %
NITRITE URINE: NEGATIVE
PH URINE: 6
PLATELET # BLD AUTO: 190 K/UL
POTASSIUM SERPL-SCNC: 4.5 MMOL/L
PROT SERPL-MCNC: 7.1 G/DL
PROTEIN URINE: ABNORMAL
PT BLD: 11.7 SEC
RBC # BLD: 5.22 M/UL
RBC # FLD: 13.5 %
RED BLOOD CELLS URINE: 12 /HPF
SODIUM SERPL-SCNC: 142 MMOL/L
SPECIFIC GRAVITY URINE: 1.03
SQUAMOUS EPITHELIAL CELLS: 1 /HPF
UROBILINOGEN URINE: NORMAL
WBC # FLD AUTO: 6.95 K/UL
WHITE BLOOD CELLS URINE: 1 /HPF

## 2019-11-16 LAB — URINE CYTOLOGY: NORMAL

## 2019-12-03 ENCOUNTER — OUTPATIENT (OUTPATIENT)
Dept: OUTPATIENT SERVICES | Facility: HOSPITAL | Age: 74
LOS: 1 days | Discharge: HOME | End: 2019-12-03
Payer: MEDICARE

## 2019-12-03 ENCOUNTER — RESULT REVIEW (OUTPATIENT)
Age: 74
End: 2019-12-03

## 2019-12-03 ENCOUNTER — APPOINTMENT (OUTPATIENT)
Dept: UROLOGY | Facility: AMBULATORY SURGERY CENTER | Age: 74
End: 2019-12-03

## 2019-12-03 DIAGNOSIS — Z98.890 OTHER SPECIFIED POSTPROCEDURAL STATES: Chronic | ICD-10-CM

## 2019-12-03 PROCEDURE — 88305 TISSUE EXAM BY PATHOLOGIST: CPT | Mod: 26

## 2019-12-03 PROCEDURE — 52204 CYSTOSCOPY W/BIOPSY(S): CPT

## 2019-12-03 PROCEDURE — 88112 CYTOPATH CELL ENHANCE TECH: CPT | Mod: 26

## 2019-12-04 ENCOUNTER — APPOINTMENT (OUTPATIENT)
Dept: UROLOGY | Facility: CLINIC | Age: 74
End: 2019-12-04
Payer: MEDICARE

## 2019-12-04 VITALS — DIASTOLIC BLOOD PRESSURE: 88 MMHG | HEART RATE: 60 BPM | SYSTOLIC BLOOD PRESSURE: 160 MMHG | TEMPERATURE: 97.7 F

## 2019-12-04 PROCEDURE — 99213 OFFICE O/P EST LOW 20 MIN: CPT

## 2019-12-04 NOTE — HISTORY OF PRESENT ILLNESS
[FreeTextEntry1] : This is a 74 year old male who underwent a cystoscopy with anesthesia yesterday 12/2/19 who presents today for a trial void.\par \par Doing well after procedure.  Denies any fever, chills, no hematuria, no pain.\par Catheter draining esteban urine without clots.\par

## 2019-12-04 NOTE — ASSESSMENT
[FreeTextEntry1] : 74 year old male s/p cystoscopy\par -instilled 100 cc of Normal Saline \par -patient able to void 70 cc \par -patient continued to drink some water and was bale to void for a second time 375 Ml\par \par PVR=20 cc\par \par As per patient scrotum size is normal and has been this size since [facility].  \par Patient instructed to call office of go to the ED if develops fever, chills, is unable to urinate.\par \par RTC in 2 weeks to see Dr. Cheung

## 2019-12-04 NOTE — PHYSICAL EXAM
[General Appearance - Well Developed] : well developed [General Appearance - Well Nourished] : well nourished [Normal Appearance] : normal appearance [Well Groomed] : well groomed [General Appearance - In No Acute Distress] : no acute distress [Abdomen Soft] : soft [Abdomen Tenderness] : non-tender [] : no respiratory distress [Respiration, Rhythm And Depth] : normal respiratory rhythm and effort [Exaggerated Use Of Accessory Muscles For Inspiration] : no accessory muscle use [Oriented To Time, Place, And Person] : oriented to person, place, and time [Affect] : the affect was normal [Mood] : the mood was normal [Not Anxious] : not anxious [FreeTextEntry1] : enlarged scrotum bilaterally

## 2019-12-05 LAB
-  AMPICILLIN: SIGNIFICANT CHANGE UP
-  CIPROFLOXACIN: SIGNIFICANT CHANGE UP
-  LEVOFLOXACIN: SIGNIFICANT CHANGE UP
-  NITROFURANTOIN: SIGNIFICANT CHANGE UP
-  TETRACYCLINE: SIGNIFICANT CHANGE UP
-  VANCOMYCIN: SIGNIFICANT CHANGE UP
CULTURE RESULTS: SIGNIFICANT CHANGE UP
METHOD TYPE: SIGNIFICANT CHANGE UP
NON-GYNECOLOGICAL CYTOLOGY STUDY: SIGNIFICANT CHANGE UP
ORGANISM # SPEC MICROSCOPIC CNT: SIGNIFICANT CHANGE UP
ORGANISM # SPEC MICROSCOPIC CNT: SIGNIFICANT CHANGE UP
SPECIMEN SOURCE: SIGNIFICANT CHANGE UP

## 2019-12-06 LAB — SURGICAL PATHOLOGY STUDY: SIGNIFICANT CHANGE UP

## 2019-12-18 ENCOUNTER — APPOINTMENT (OUTPATIENT)
Dept: UROLOGY | Facility: CLINIC | Age: 74
End: 2019-12-18
Payer: MEDICARE

## 2019-12-18 VITALS — DIASTOLIC BLOOD PRESSURE: 74 MMHG | HEART RATE: 58 BPM | SYSTOLIC BLOOD PRESSURE: 122 MMHG | TEMPERATURE: 98.3 F

## 2019-12-18 PROCEDURE — 99213 OFFICE O/P EST LOW 20 MIN: CPT

## 2019-12-18 RX ORDER — BISOPROLOL FUMARATE 5 MG/1
TABLET, FILM COATED ORAL
Refills: 0 | Status: ACTIVE | COMMUNITY

## 2019-12-18 NOTE — LETTER BODY
[Dear  ___] : Dear  [unfilled], [Courtesy Letter:] : I had the pleasure of seeing your patient, [unfilled], in my office today. [Please see my note below.] : Please see my note below. [Consult Closing:] : Thank you very much for allowing me to participate in the care of this patient.  If you have any questions, please do not hesitate to contact me. [Sincerely,] : Sincerely, [FreeTextEntry2] : Cole Crews, \par [address]\[place] [address] [FreeTextEntry3] : Zoltan Cheung MD\par Urologic Oncology\par Department of Urology\par [facility]\par \par [employer] [facility] \par \par

## 2019-12-18 NOTE — PHYSICAL EXAM
[General Appearance - Well Developed] : well developed [General Appearance - Well Nourished] : well nourished [Normal Appearance] : normal appearance [Well Groomed] : well groomed [General Appearance - In No Acute Distress] : no acute distress [Abdomen Tenderness] : non-tender [Abdomen Soft] : soft [Costovertebral Angle Tenderness] : no ~M costovertebral angle tenderness [Urinary Bladder Findings] : the bladder was normal on palpation [Affect] : the affect was normal [Oriented To Time, Place, And Person] : oriented to person, place, and time [Mood] : the mood was normal [Not Anxious] : not anxious [Normal Station and Gait] : the gait and station were normal for the patient's age [No Focal Deficits] : no focal deficits

## 2019-12-18 NOTE — ASSESSMENT
[FreeTextEntry1] : 75yo male with history of a very enlarged prostate, elevated but stable PSA with several prior negative biopsies\par S/p robotic simple prostatectomy 1/2019 with Dr. Joyce\par Stopped finasteride and tamsulosin\par Recent gross hematuria. Unclear if he had UTI because he was on antibiotics when urine was tested\par CT urogram unremarkable\par Cystoscopy with bladder biopsy, cytology 12/3/19 negative\par No further hematuria\par f/u 3 months or sooner prn

## 2019-12-18 NOTE — HISTORY OF PRESENT ILLNESS
[FreeTextEntry1] : This is a 70yo male with a complicated urological history referred for a recent episode of gross hematuria. The hematuria occurred about 1 week ago. He was diagnosed with a UTI and treated with antibiotics although urine culture is negative. His hematuria has resolved. He reports baseline nocturia 2-3x a night and urinary frequency. He has been on tamsulosin 0.8mg daily and finasteride for several years. He has a long standing history of BPH with a very large prostate (> 250gm) and elevated PSA in 5-10 range over the last several years. He has undergone several prior prostate biopsies, all of which have been negative. The last biopsy was a few years ago. A MRI from last year shows a questionable PI-RADS 4 lesion but this is not clear on the report. A CT scan in December performed for hematuria is negative for renal or bladder abnormalities. It does show a hepatic mass which was determined to be benign on follow up liver imaging. \par \par 9/11/17 Here for f/u. Had negative cystoscopy at last visit. Reports episode of gross hematuria 2 weeks ago that resolved in 4 days. Hematuria was mild, no significant clots or difficulty urinating. \par \par 10/18/17 Here for f/u. Recent PSA = 6.4 which is down from previous levels. Had CT urogram for hematuria which is unremarkable. Urine culture, cytology negative. Voiding symptoms stable, mildly bothersome.\par \par 5/20/19 Here for f/u. Underwent robotic suprapubic prostatectomy 1/2019 with Dr. Joyce. Path benign. Recent PSA = 1.8. \par \par 10/02/19 Here for f/u. Had episode of hematuria last month, he took antibiotics from home before discussing with physician. U/A performed after antibiotics was negative. No bleeding for the last few days. \par \par 11/4/19 Here for f/u. Had another episode of hematuria. CT urogram negative. Culture negative. Cytology atypical. \par \par 12/18/19 Here for f/u. Underwent cystoscopy with bladder biopsy 12/3. Biopsy negative, cytology negative. High bladder neck noted, no stricture.  [Urinary Frequency] : urinary frequency [Nocturia] : nocturia [Weak Stream] : weak stream [None] : None

## 2021-02-18 ENCOUNTER — EMERGENCY (EMERGENCY)
Facility: HOSPITAL | Age: 76
LOS: 1 days | Discharge: HOME | End: 2021-02-18
Attending: EMERGENCY MEDICINE | Admitting: EMERGENCY MEDICINE
Payer: MEDICARE

## 2021-02-18 VITALS
DIASTOLIC BLOOD PRESSURE: 71 MMHG | HEART RATE: 72 BPM | OXYGEN SATURATION: 98 % | TEMPERATURE: 98 F | WEIGHT: 220.02 LBS | HEIGHT: 73 IN | RESPIRATION RATE: 18 BRPM | SYSTOLIC BLOOD PRESSURE: 127 MMHG

## 2021-02-18 VITALS
DIASTOLIC BLOOD PRESSURE: 87 MMHG | TEMPERATURE: 98 F | OXYGEN SATURATION: 99 % | RESPIRATION RATE: 18 BRPM | HEART RATE: 55 BPM | SYSTOLIC BLOOD PRESSURE: 142 MMHG

## 2021-02-18 DIAGNOSIS — Z98.890 OTHER SPECIFIED POSTPROCEDURAL STATES: Chronic | ICD-10-CM

## 2021-02-18 DIAGNOSIS — R31.9 HEMATURIA, UNSPECIFIED: ICD-10-CM

## 2021-02-18 PROCEDURE — 99283 EMERGENCY DEPT VISIT LOW MDM: CPT

## 2021-02-18 NOTE — ED PROVIDER NOTE - OBJECTIVE STATEMENT
75 y.o. male with hx BPH s/p TURP 1.5 years ago with intermittent hematuria, here for recurrence of hematuria, states he saw a small amount of small dirt sized clots in his urine, less than prior episodes, no feeling of bladder fullness or obstruction, no fever/chills, no frequency no vomiting no back pain. Urology dr mclaughlin, didn't try to call his urologist. No blood thinners, no aspirin, no trauma.

## 2021-02-18 NOTE — ED PROVIDER NOTE - CLINICAL SUMMARY MEDICAL DECISION MAKING FREE TEXT BOX
UA noted (+) blood, no UTI, stable fri PA home, outpatient followup with urology, return for obstructive sx.

## 2021-02-18 NOTE — ED PROVIDER NOTE - PATIENT PORTAL LINK FT
You can access the FollowMyHealth Patient Portal offered by [facility] by registering at the following website: [email or web address]. By joining [employer]s FollowMyHealth portal, you will also be able to view your health information using other applications (apps) compatible with our system.

## 2021-02-18 NOTE — ED PROVIDER NOTE - PHYSICAL EXAMINATION
VSS in NAD non toxic appearing   abd soft NT ND no CVAT   normal neuro exam CN I-XII grossly intact, no gross motor or sensory deficits  no peripheral c/c/e

## 2021-03-03 ENCOUNTER — APPOINTMENT (OUTPATIENT)
Dept: UROLOGY | Facility: CLINIC | Age: 76
End: 2021-03-03
Payer: MEDICARE

## 2021-03-03 VITALS
WEIGHT: 222 LBS | TEMPERATURE: 97.2 F | SYSTOLIC BLOOD PRESSURE: 151 MMHG | HEART RATE: 59 BPM | BODY MASS INDEX: 29.29 KG/M2 | DIASTOLIC BLOOD PRESSURE: 73 MMHG

## 2021-03-03 PROCEDURE — 99072 ADDL SUPL MATRL&STAF TM PHE: CPT

## 2021-03-03 PROCEDURE — 99213 OFFICE O/P EST LOW 20 MIN: CPT

## 2021-03-03 NOTE — HISTORY OF PRESENT ILLNESS
[FreeTextEntry1] : This is a 70yo male with a complicated urological history referred for a recent episode of gross hematuria. The hematuria occurred about 1 week ago. He was diagnosed with a UTI and treated with antibiotics although urine culture is negative. His hematuria has resolved. He reports baseline nocturia 2-3x a night and urinary frequency. He has been on tamsulosin 0.8mg daily and finasteride for several years. He has a long standing history of BPH with a very large prostate (> 250gm) and elevated PSA in 5-10 range over the last several years. He has undergone several prior prostate biopsies, all of which have been negative. The last biopsy was a few years ago. A MRI from last year shows a questionable PI-RADS 4 lesion but this is not clear on the report. A CT scan in December performed for hematuria is negative for renal or bladder abnormalities. It does show a hepatic mass which was determined to be benign on follow up liver imaging. \par \par 9/11/17 Here for f/u. Had negative cystoscopy at last visit. Reports episode of gross hematuria 2 weeks ago that resolved in 4 days. Hematuria was mild, no significant clots or difficulty urinating. \par \par 10/18/17 Here for f/u. Recent PSA = 6.4 which is down from previous levels. Had CT urogram for hematuria which is unremarkable. Urine culture, cytology negative. Voiding symptoms stable, mildly bothersome.\par \par 5/20/19 Here for f/u. Underwent robotic suprapubic prostatectomy 1/2019 with Dr. Joyce. Path benign. Recent PSA = 1.8. \par \par 10/02/19 Here for f/u. Had episode of hematuria last month, he took antibiotics from home before discussing with physician. U/A performed after antibiotics was negative. No bleeding for the last few days. \par \par 11/4/19 Here for f/u. Had another episode of hematuria. CT urogram negative. Culture negative. Cytology atypical. \par \par 12/18/19 Here for f/u. Underwent cystoscopy with bladder biopsy 12/3. Biopsy negative, cytology negative. High bladder neck noted, no stricture. \par \par 3/03/21Here for f/u. Last seen > 1 year ago. Recent episode of gross hematuria, urine dip reportedly negative for infection. No further hematuria.  [Urinary Frequency] : urinary frequency [Nocturia] : nocturia [Weak Stream] : weak stream [None] : None

## 2021-03-03 NOTE — ASSESSMENT
[FreeTextEntry1] : 74yo male with history of a very enlarged prostate, elevated but stable PSA with several prior negative biopsies\par S/p robotic simple prostatectomy 1/2019 with Dr. Joyce\par Gross hematuria 11/2019 with negative workup \par Now with new episode of gross hematuria\par Recommend repeat CT urogram, cystoscopy

## 2021-03-03 NOTE — LETTER BODY
[Dear  ___] : Dear  [unfilled], [Courtesy Letter:] : I had the pleasure of seeing your patient, [unfilled], in my office today. [Please see my note below.] : Please see my note below. [Consult Closing:] : Thank you very much for allowing me to participate in the care of this patient.  If you have any questions, please do not hesitate to contact me. [Sincerely,] : Sincerely, [FreeTextEntry2] : Cole Crews, \par [address]\[place] [address] [FreeTextEntry3] : Zoltan Cheung MD\par Urologic Oncology\par Department of Urology\par [facility]\par \par [employer] [facility] \par \par

## 2021-03-05 LAB — URINE CYTOLOGY: NORMAL

## 2021-03-08 ENCOUNTER — NON-APPOINTMENT (OUTPATIENT)
Age: 76
End: 2021-03-08

## 2021-03-08 LAB — BACTERIA UR CULT: ABNORMAL

## 2021-03-08 RX ORDER — CIPROFLOXACIN HYDROCHLORIDE 500 MG/1
500 TABLET, FILM COATED ORAL
Qty: 14 | Refills: 0 | Status: ACTIVE | COMMUNITY
Start: 2021-03-08 | End: 1900-01-01

## 2021-03-24 ENCOUNTER — APPOINTMENT (OUTPATIENT)
Age: 76
End: 2021-03-24
Payer: MEDICARE

## 2021-03-24 PROCEDURE — 0002A: CPT

## 2022-03-20 ENCOUNTER — EMERGENCY (EMERGENCY)
Facility: HOSPITAL | Age: 77
LOS: 1 days | Discharge: HOME | End: 2022-03-20
Attending: EMERGENCY MEDICINE | Admitting: EMERGENCY MEDICINE
Payer: MEDICARE

## 2022-03-20 VITALS
RESPIRATION RATE: 15 BRPM | HEART RATE: 64 BPM | DIASTOLIC BLOOD PRESSURE: 79 MMHG | WEIGHT: 214.95 LBS | HEIGHT: 73 IN | TEMPERATURE: 97 F | OXYGEN SATURATION: 98 % | SYSTOLIC BLOOD PRESSURE: 169 MMHG

## 2022-03-20 DIAGNOSIS — R07.89 OTHER CHEST PAIN: ICD-10-CM

## 2022-03-20 DIAGNOSIS — I10 ESSENTIAL (PRIMARY) HYPERTENSION: ICD-10-CM

## 2022-03-20 DIAGNOSIS — I48.91 UNSPECIFIED ATRIAL FIBRILLATION: ICD-10-CM

## 2022-03-20 DIAGNOSIS — Z98.890 OTHER SPECIFIED POSTPROCEDURAL STATES: Chronic | ICD-10-CM

## 2022-03-20 LAB
ALBUMIN SERPL ELPH-MCNC: 3.6 G/DL — SIGNIFICANT CHANGE UP (ref 3.4–5)
ALP SERPL-CCNC: 65 U/L — SIGNIFICANT CHANGE UP (ref 40–120)
ALT FLD-CCNC: 35 U/L — SIGNIFICANT CHANGE UP (ref 12–42)
ANION GAP SERPL CALC-SCNC: 4 MMOL/L — LOW (ref 9–16)
APTT BLD: 32.5 SEC — SIGNIFICANT CHANGE UP (ref 27.5–35.5)
AST SERPL-CCNC: 21 U/L — SIGNIFICANT CHANGE UP (ref 15–37)
BASOPHILS # BLD AUTO: 0.05 K/UL — SIGNIFICANT CHANGE UP (ref 0–0.2)
BASOPHILS NFR BLD AUTO: 0.8 % — SIGNIFICANT CHANGE UP (ref 0–2)
BILIRUB SERPL-MCNC: 0.6 MG/DL — SIGNIFICANT CHANGE UP (ref 0.2–1.2)
BUN SERPL-MCNC: 26 MG/DL — HIGH (ref 7–23)
CALCIUM SERPL-MCNC: 9 MG/DL — SIGNIFICANT CHANGE UP (ref 8.5–10.5)
CHLORIDE SERPL-SCNC: 106 MMOL/L — SIGNIFICANT CHANGE UP (ref 96–108)
CO2 SERPL-SCNC: 30 MMOL/L — SIGNIFICANT CHANGE UP (ref 22–31)
CREAT SERPL-MCNC: 0.87 MG/DL — SIGNIFICANT CHANGE UP (ref 0.5–1.3)
D DIMER BLD IA.RAPID-MCNC: <187 NG/ML DDU — SIGNIFICANT CHANGE UP
EGFR: 90 ML/MIN/1.73M2 — SIGNIFICANT CHANGE UP
EOSINOPHIL # BLD AUTO: 0.24 K/UL — SIGNIFICANT CHANGE UP (ref 0–0.5)
EOSINOPHIL NFR BLD AUTO: 3.8 % — SIGNIFICANT CHANGE UP (ref 0–6)
GLUCOSE SERPL-MCNC: 93 MG/DL — SIGNIFICANT CHANGE UP (ref 70–99)
HCT VFR BLD CALC: 45.7 % — SIGNIFICANT CHANGE UP (ref 39–50)
HGB BLD-MCNC: 14.9 G/DL — SIGNIFICANT CHANGE UP (ref 13–17)
IMM GRANULOCYTES NFR BLD AUTO: 0.3 % — SIGNIFICANT CHANGE UP (ref 0–1.5)
INR BLD: 1.12 — SIGNIFICANT CHANGE UP (ref 0.88–1.16)
LYMPHOCYTES # BLD AUTO: 2.83 K/UL — SIGNIFICANT CHANGE UP (ref 1–3.3)
LYMPHOCYTES # BLD AUTO: 44.6 % — HIGH (ref 13–44)
MCHC RBC-ENTMCNC: 30.3 PG — SIGNIFICANT CHANGE UP (ref 27–34)
MCHC RBC-ENTMCNC: 32.6 GM/DL — SIGNIFICANT CHANGE UP (ref 32–36)
MCV RBC AUTO: 93.1 FL — SIGNIFICANT CHANGE UP (ref 80–100)
MONOCYTES # BLD AUTO: 0.58 K/UL — SIGNIFICANT CHANGE UP (ref 0–0.9)
MONOCYTES NFR BLD AUTO: 9.1 % — SIGNIFICANT CHANGE UP (ref 2–14)
NEUTROPHILS # BLD AUTO: 2.63 K/UL — SIGNIFICANT CHANGE UP (ref 1.8–7.4)
NEUTROPHILS NFR BLD AUTO: 41.4 % — LOW (ref 43–77)
NRBC # BLD: 0 /100 WBCS — SIGNIFICANT CHANGE UP (ref 0–0)
PLATELET # BLD AUTO: 164 K/UL — SIGNIFICANT CHANGE UP (ref 150–400)
POTASSIUM SERPL-MCNC: 4.2 MMOL/L — SIGNIFICANT CHANGE UP (ref 3.5–5.3)
POTASSIUM SERPL-SCNC: 4.2 MMOL/L — SIGNIFICANT CHANGE UP (ref 3.5–5.3)
PROT SERPL-MCNC: 6.9 G/DL — SIGNIFICANT CHANGE UP (ref 6.4–8.2)
PROTHROM AB SERPL-ACNC: 13.1 SEC — SIGNIFICANT CHANGE UP (ref 10.5–13.4)
RBC # BLD: 4.91 M/UL — SIGNIFICANT CHANGE UP (ref 4.2–5.8)
RBC # FLD: 13.2 % — SIGNIFICANT CHANGE UP (ref 10.3–14.5)
SODIUM SERPL-SCNC: 140 MMOL/L — SIGNIFICANT CHANGE UP (ref 132–145)
TROPONIN I, HIGH SENSITIVITY RESULT: 7.2 NG/L — SIGNIFICANT CHANGE UP
WBC # BLD: 6.35 K/UL — SIGNIFICANT CHANGE UP (ref 3.8–10.5)
WBC # FLD AUTO: 6.35 K/UL — SIGNIFICANT CHANGE UP (ref 3.8–10.5)

## 2022-03-20 PROCEDURE — 71045 X-RAY EXAM CHEST 1 VIEW: CPT | Mod: 26

## 2022-03-20 PROCEDURE — 99285 EMERGENCY DEPT VISIT HI MDM: CPT | Mod: 25

## 2022-03-20 PROCEDURE — 93010 ELECTROCARDIOGRAM REPORT: CPT

## 2022-03-20 RX ORDER — ACETAMINOPHEN 500 MG
650 TABLET ORAL ONCE
Refills: 0 | Status: COMPLETED | OUTPATIENT
Start: 2022-03-20 | End: 2022-03-20

## 2022-03-20 RX ORDER — METHOCARBAMOL 500 MG/1
750 TABLET, FILM COATED ORAL ONCE
Refills: 0 | Status: COMPLETED | OUTPATIENT
Start: 2022-03-20 | End: 2022-03-20

## 2022-03-20 RX ORDER — IBUPROFEN 200 MG
400 TABLET ORAL ONCE
Refills: 0 | Status: COMPLETED | OUTPATIENT
Start: 2022-03-20 | End: 2022-03-20

## 2022-03-20 RX ORDER — METHOCARBAMOL 500 MG/1
2 TABLET, FILM COATED ORAL
Qty: 18 | Refills: 0
Start: 2022-03-20 | End: 2022-03-22

## 2022-03-20 RX ADMIN — METHOCARBAMOL 750 MILLIGRAM(S): 500 TABLET, FILM COATED ORAL at 21:10

## 2022-03-20 RX ADMIN — Medication 400 MILLIGRAM(S): at 21:10

## 2022-03-20 RX ADMIN — Medication 650 MILLIGRAM(S): at 21:10

## 2022-03-20 NOTE — ED PROVIDER NOTE - NSFOLLOWUPINSTRUCTIONS_ED_ALL_ED_FT
Chest Pain- I suspect muslcear chest pain.    Please confider seeing a Chiropractor or an Osteopath.    Consider follow up with Dr. Hinds or Dr. Mcconnell at [facility]. They are osteopathic MDs who can do bone and tissue adjustments as well as set up high quality physical therapy.   [address] [place] [address], [place] [address]  Phone: [phone]     Consider follow up with Dr. Steve Bose (local Chiropractor)  [address]  [phone]    OTC Motrin/Advil (ibuprofen) 400mg every 6h and/or Tylenol (acetaminophen) 1000mg every 6h for pain.   Return for worse pain, new worrisome symptoms or other medical emergencies.     Chest pain can be caused by many different conditions which may or may not be dangerous. Causes include heartburn, lung infections, heart attack, blood clot in lungs, skin infections, strain or damage to muscle, cartilage, or bones, etc. In addition to a history and physical examination, an electrocardiogram (ECG) or other lab tests may have been performed to determine the cause of your chest pain. Follow up with your primary care provider or with a cardiologist as instructed.     SEEK IMMEDIATE MEDICAL CARE IF YOU HAVE ANY OF THE FOLLOWING SYMPTOMS: worsening chest pain, coughing up blood, unexplained back/neck/jaw pain, severe abdominal pain, dizziness or lightheadedness, fainting, shortness of breath, sweaty or clammy skin, vomiting, or racing heart beat. These symptoms may represent a serious problem that is an emergency. Do not wait to see if the symptoms will go away. Get medical help right away. Call 911 and do not drive yourself to the hospital.

## 2022-03-20 NOTE — ED PROVIDER NOTE - PATIENT PORTAL LINK FT
You can access the FollowMyHealth Patient Portal offered by [facility] by registering at the following website: [email or web address]. By joining [employer]’s FollowMyHealth portal, you will also be able to view your health information using other applications (apps) compatible with our system.

## 2022-03-20 NOTE — ED PROVIDER NOTE - PHYSICAL EXAMINATION
VITAL SIGNS: I have reviewed nursing notes and confirm.  CONSTITUTIONAL: Well-developed; well-nourished; in no acute distress.  SKIN: Skin is warm and dry, no acute rash.  EYES: Pupils round bilaterally, 3mm; conjunctiva and sclera clear.  ENT: No nasal discharge; airway clear, MMM.  NECK: Supple; non tender.  CARD: S1, S2 normal; no murmurs, gallops, or rubs. Regular rate and rhythm. + focal chest wall tenderness around 4/5 intercostal just medial to the L side of the sternum.   RESP: No wheezes, rales or rhonchi.  : No CVAT tenderness bilaterally   ABD: Soft; non-distended; non-tender; no rebound or guarding.  EXT: Normal ROM. No clubbing, cyanosis or edema.  NEURO: Alert, oriented. Grossly unremarkable.   PSYCH: Cooperative, appropriate. Mood and affect wnl.

## 2022-03-20 NOTE — ED PROVIDER NOTE - NS_EDPROVIDERDISPOUSERTYPE_ED_A_ED
1. We will notify you when the referral for the echocardiogram is ready and I will let you know the results when they are available    2. We will let you know the results of your labs when they are available.     3. Medications have been refilled and sent to mail order [facility]      
Attending Attestation (For Attendings USE Only)...

## 2022-03-20 NOTE — ED PROVIDER NOTE - OBJECTIVE STATEMENT
76 M presenting with L sided pangs of sharp CP today and a couple of days ago. Worse when arms were raise ie. driving a car. He and his partner were lifting 5 gallon bottle of water the other day. He has no pain now- last time was 20 mins ago. No SOB and no abdo pain. He has no hx fo CAD. he did have AF in the setting of Prostate surgery. Otherwise has HTN and is healthy. No f/c.

## 2022-03-20 NOTE — ED PROVIDER NOTE - NSICDXPASTMEDICALHX_GEN_ALL_CORE_FT
PAST MEDICAL HISTORY:  Benign prostatic hyperplasia with urinary retention     Elevated PSA     HTN (hypertension)     Lone atrial fibrillation

## 2022-03-20 NOTE — ED PROVIDER NOTE - CARE PROVIDER_API CALL
Ruddy Schuler)  Cardiovascular Disease  [address] [place] [address]  Phone: [phone]  Fax: [phone]  Follow Up Time:

## 2022-03-20 NOTE — ED ADULT TRIAGE NOTE - CHIEF COMPLAINT QUOTE
Pt with complaint of intermittent sharp pain for the past 3 days. Pt reports pain is right under the left breast. Denies SOB, dizziness or palpitations. Reports history of high cholesterol and high blood pressure.

## 2022-06-23 NOTE — ED PROVIDER NOTE - CPE EDP GASTRO NORM
SUBJECTIVE:  The patient is a 53year old female who presented requesting evaluation of left medial/posterior knee pain that has been ongoing for the last two weeks. She describes a sore feeling in this area, symptoms have waxed and waned in severity. No injury reported. She has experienced similar symptoms in the past which have resolved with conservative management. She has tried using ibuprofen for the pain with some relief. Again, the pain waxes and wanes in severity. It seems to be worse 1st thing in the morning and after period of immobility. Does improve slightly with ambulation though overall worsened by the end of the day if she is on her feet quite often. She denies any lower extremity swelling or discoloration. No history of DVT reported. OBJECTIVE:  PAST HISTORIES:  I have reviewed the past medical history, family history, social history, medications and allergies listed in the medical record as well as the nursing notes for this encounter. PROBLEM LIST:  Patient Active Problem List   Diagnosis   â¢ Hypothyroidism   â¢ Morbid obesity with BMI of 45.0-49.9, adult (CMS/[facility])   â¢ Depression       ALLERGIES:   Reviewed and updated in the EHR. MEDICATIONS:   Reviewed and updated in the EHR. REVIEW OF SYSTEMS:   Positive for:  Left medial/posterior knee pain. .     Negative for: Fevers, chills, weight loss, change in vision, paresthesias, nausea, vomiting, lightheadedness, dizziness, shortness of breath, cough, chest pain, rashes, skin erythema, induration, changes in bowel habit, changes in urinary habit, changes in mood. PHYSICAL EXAM:  Visit Vitals  BP (!) 145/83   Pulse 64   Temp 98.5 Â°F (36.9 Â°C) (Tympanic)   Resp 18   Wt 125.6 kg (277 lb)   SpO2 99%   BMI 49.07 kg/mÂ²     Constitutional:  Well-developed, well-nourished female in no acute distress. Musculoskeletal:  Normal range of motion and strength in bilateral lower extremities.   She has full range of motion in the left knee without any points of bony tenderness. There is tenderness when palpating the medial side of the knee in general.  There is some tenderness when palpating the medial hamstring tendon on the left as well. There is no calf swelling or tenderness bilaterally. Integumentary:  Warm, dry, pink. No acute appearing swelling or asymmetry of the lower extremities, no overlying erythema or bruising. Neurologic:  Alert, oriented times 3, Normal sensory, normal motor function, no focal defects. Psychiatric:  Cooperative, appropriate mood and affect, normal judgment. Assessment:  1. Sprain of medial collateral ligament of left knee, initial encounter        Plan:  Orders Placed This Encounter   â¢ diclofenac (VOLTAREN) 50 MG EC tablet     History examination today is most consistent with a sprain of the left knee, specifically the medial collateral ligament. Symptoms also represent a meniscus injury. Low suspicion for a DVT at this point though signs and symptoms were discussed. We reviewed supportive/conservative care including rest, ice and elevation of the knee with use of a compression wrap. She was agreeable to trying diclofenac instead of ibuprofen which was sent her pharmacy. She may use Tylenol in addition. If symptoms are not improving as expected, recommend re-evaluation with PCP. She understands and agrees with plan. None  No follow-ups on file. Instructions noted per AVS/patient instructions. The patient indicates understanding of these issues and agrees with the plan. normal...

## 2022-10-11 NOTE — ASU PREOP CHECKLIST - TEMPERATURE IN FAHRENHEIT (DEGREES F)
Please get CT of abdomen  Take metronidazole and bactrim antibiotics for 7 days  Get xrays of hip, knees, and feet  Drink plenty of water  Return to clinic within a month  If pain worsens, to ER   97.7

## 2022-11-15 PROBLEM — I48.91 UNSPECIFIED ATRIAL FIBRILLATION: Chronic | Status: ACTIVE | Noted: 2022-03-20

## 2022-11-23 ENCOUNTER — APPOINTMENT (OUTPATIENT)
Dept: UROLOGY | Facility: CLINIC | Age: 77
End: 2022-11-23

## 2022-11-23 VITALS — HEART RATE: 53 BPM | DIASTOLIC BLOOD PRESSURE: 70 MMHG | SYSTOLIC BLOOD PRESSURE: 138 MMHG | TEMPERATURE: 98 F

## 2022-11-23 DIAGNOSIS — N13.8 BENIGN PROSTATIC HYPERPLASIA WITH LOWER URINARY TRACT SYMPMS: ICD-10-CM

## 2022-11-23 DIAGNOSIS — N40.1 BENIGN PROSTATIC HYPERPLASIA WITH LOWER URINARY TRACT SYMPMS: ICD-10-CM

## 2022-11-23 DIAGNOSIS — R31.0 GROSS HEMATURIA: ICD-10-CM

## 2022-11-23 DIAGNOSIS — K40.90 UNILATERAL INGUINAL HERNIA, W/OUT OBSTRUCTION OR GANGRENE, NOT SPECIFIED AS RECURRENT: ICD-10-CM

## 2022-11-23 DIAGNOSIS — N35.912 UNSPECIFIED BULBOUS URETHRAL STRICTURE, MALE: ICD-10-CM

## 2022-11-23 PROCEDURE — 99214 OFFICE O/P EST MOD 30 MIN: CPT

## 2022-11-25 ENCOUNTER — NON-APPOINTMENT (OUTPATIENT)
Age: 77
End: 2022-11-25

## 2022-11-25 LAB
APPEARANCE: CLEAR
BACTERIA UR CULT: NORMAL
BACTERIA: NEGATIVE
BILIRUBIN URINE: NEGATIVE
BLOOD URINE: NEGATIVE
COLOR: NORMAL
GLUCOSE QUALITATIVE U: NEGATIVE
HYALINE CASTS: 0 /LPF
KETONES URINE: NEGATIVE
LEUKOCYTE ESTERASE URINE: NEGATIVE
MICROSCOPIC-UA: NORMAL
NITRITE URINE: NEGATIVE
PH URINE: 6
PROTEIN URINE: NORMAL
RED BLOOD CELLS URINE: 2 /HPF
SPECIFIC GRAVITY URINE: 1.03
SQUAMOUS EPITHELIAL CELLS: 0 /HPF
UROBILINOGEN URINE: NORMAL
WHITE BLOOD CELLS URINE: 2 /HPF

## 2022-11-26 ENCOUNTER — APPOINTMENT (OUTPATIENT)
Dept: CT IMAGING | Facility: CLINIC | Age: 77
End: 2022-11-26

## 2022-11-26 ENCOUNTER — OUTPATIENT (OUTPATIENT)
Dept: OUTPATIENT SERVICES | Facility: HOSPITAL | Age: 77
LOS: 1 days | End: 2022-11-26

## 2022-11-26 ENCOUNTER — RESULT REVIEW (OUTPATIENT)
Age: 77
End: 2022-11-26

## 2022-11-26 DIAGNOSIS — Z98.890 OTHER SPECIFIED POSTPROCEDURAL STATES: Chronic | ICD-10-CM

## 2022-11-26 PROCEDURE — 74178 CT ABD&PLV WO CNTR FLWD CNTR: CPT | Mod: 26

## 2022-11-27 PROBLEM — K40.90 INGUINAL HERNIA, RIGHT: Status: ACTIVE | Noted: 2022-11-27

## 2022-11-27 NOTE — HISTORY OF PRESENT ILLNESS
[Urinary Frequency] : urinary frequency [Nocturia] : nocturia [Weak Stream] : weak stream [None] : None [FreeTextEntry1] : This is a 72yo male with a complicated urological history referred for a recent episode of gross hematuria. The hematuria occurred about 1 week ago. He was diagnosed with a UTI and treated with antibiotics although urine culture is negative. His hematuria has resolved. He reports baseline nocturia 2-3x a night and urinary frequency. He has been on tamsulosin 0.8mg daily and finasteride for several years. He has a long standing history of BPH with a very large prostate (> 250gm) and elevated PSA in 5-10 range over the last several years. He has undergone several prior prostate biopsies, all of which have been negative. The last biopsy was a few years ago. A MRI from last year shows a questionable PI-RADS 4 lesion but this is not clear on the report. A CT scan in December performed for hematuria is negative for renal or bladder abnormalities. It does show a hepatic mass which was determined to be benign on follow up liver imaging. \par \par 9/11/17 Here for f/u. Had negative cystoscopy at last visit. Reports episode of gross hematuria 2 weeks ago that resolved in 4 days. Hematuria was mild, no significant clots or difficulty urinating. \par \par 10/18/17 Here for f/u. Recent PSA = 6.4 which is down from previous levels. Had CT urogram for hematuria which is unremarkable. Urine culture, cytology negative. Voiding symptoms stable, mildly bothersome.\par \par 5/20/19 Here for f/u. Underwent robotic suprapubic prostatectomy 1/2019 with Dr. Joyce. Path benign. Recent PSA = 1.8. \par \par 10/02/19 Here for f/u. Had episode of hematuria last month, he took antibiotics from home before discussing with physician. U/A performed after antibiotics was negative. No bleeding for the last few days. \par \par 11/4/19 Here for f/u. Had another episode of hematuria. CT urogram negative. Culture negative. Cytology atypical. \par \par 12/18/19 Here for f/u. Underwent cystoscopy with bladder biopsy 12/3. Biopsy negative, cytology negative. High bladder neck noted, no stricture. \par \par 3/03/21 Here for f/u. Last seen > 1 year ago. Recent episode of gross hematuria, urine dip reportedly negative for infection. No further hematuria. \par \par 11/22/22 Here for follow up. Last seen 3/2021. Still with intermittent gross hematuria. Last episode three months ago.  Also complains of painless R testicular swelling for the past three years. Swelling is constant, not bothersome.  He was recently told it may be a hernia and is concerned for strangulation.

## 2022-11-27 NOTE — PHYSICAL EXAM
[General Appearance - Well Developed] : well developed [General Appearance - Well Nourished] : well nourished [Normal Appearance] : normal appearance [Well Groomed] : well groomed [General Appearance - In No Acute Distress] : no acute distress [Abdomen Soft] : soft [Abdomen Tenderness] : non-tender [Costovertebral Angle Tenderness] : no ~M costovertebral angle tenderness [Urinary Bladder Findings] : the bladder was normal on palpation [Oriented To Time, Place, And Person] : oriented to person, place, and time [Affect] : the affect was normal [Mood] : the mood was normal [Not Anxious] : not anxious [Normal Station and Gait] : the gait and station were normal for the patient's age [No Focal Deficits] : no focal deficits [FreeTextEntry1] : moderate-large testicular swelling, suspect right inguinal hernia

## 2022-11-27 NOTE — LETTER BODY
[Dear  ___] : Dear  [unfilled], [Courtesy Letter:] : I had the pleasure of seeing your patient, [unfilled], in my office today. [Please see my note below.] : Please see my note below. [Consult Closing:] : Thank you very much for allowing me to participate in the care of this patient.  If you have any questions, please do not hesitate to contact me. [Sincerely,] : Sincerely, [FreeTextEntry2] : Cole Crews, \par [address]\[place] [address] [FreeTextEntry3] : Zoltan Cheung MD\par Urologic Oncology\par Department of Urology\par [facility]\par \par [employer] [facility] \par \par

## 2022-11-27 NOTE — ASSESSMENT
[FreeTextEntry1] : 75yo male with history of a very enlarged prostate, elevated but stable PSA with several prior negative biopsies\par S/p robotic simple prostatectomy 1/2019 with Dr. Joyce\par Intermittent gross hematuria 11/2019 with negative workup \par CT scan 3/2022 - no sig findings. \par Recommend repeat CT urogram, cystoscopy\par H/o bulbar stricture\par He is very reluctant to repeat cystoscopy but importance of workup reviewed \par \par Right testicular swelling\par suspect inguinal hernia, will eval with CT\par \par

## 2022-11-28 LAB — URINE CYTOLOGY: NORMAL

## 2022-12-01 ENCOUNTER — NON-APPOINTMENT (OUTPATIENT)
Age: 77
End: 2022-12-01

## 2023-02-16 NOTE — ED PROVIDER NOTE - CARE PLAN
Principal Discharge DX:	Urinary retention  Secondary Diagnosis:	Kidney failure, acute
This was a shared visit with the BRITTANIE. I reviewed and verified the documentation and independently performed the documented:

## 2023-06-08 NOTE — ED PROVIDER NOTE - PMH
3 times per day and after BM Benign prostatic hyperplasia with urinary retention    Elevated PSA    HTN (hypertension)

## 2024-06-24 NOTE — REVIEW OF SYSTEMS
Quality 226: Preventive Care And Screening: Tobacco Use: Screening And Cessation Intervention: Patient screened for tobacco use and is an ex/non-smoker Detail Level: Detailed Quality 431: Preventive Care And Screening: Unhealthy Alcohol Use - Screening: Patient not identified as an unhealthy alcohol user when screened for unhealthy alcohol use using a systematic screening method Quality 130: Documentation Of Current Medications In The Medical Record: Current Medications Documented [Negative] : Gastrointestinal

## 2024-08-03 NOTE — DISCHARGE NOTE ADULT - DO YOU HAVE DIFFICULTY CLIMBING STAIRS
5 = Markedly ill - intrusive symptoms that distinctly impair social/occupational function or cause intrusive levels of distress No

## 2024-11-05 NOTE — ED ADULT NURSE NOTE - CHIEF COMPLAINT QUOTE
Received patient as walkin with complaints of 'blood in the urine and burning on urination since this morning.' denies any pain, states this has happened before. DISPLAY PLAN FREE TEXT

## 2024-12-13 ENCOUNTER — EMERGENCY (EMERGENCY)
Facility: HOSPITAL | Age: 79
LOS: 1 days | Discharge: HOME | End: 2024-12-13
Attending: EMERGENCY MEDICINE | Admitting: EMERGENCY MEDICINE
Payer: MEDICARE

## 2024-12-13 VITALS
HEART RATE: 62 BPM | DIASTOLIC BLOOD PRESSURE: 75 MMHG | TEMPERATURE: 98 F | SYSTOLIC BLOOD PRESSURE: 148 MMHG | OXYGEN SATURATION: 95 % | RESPIRATION RATE: 16 BRPM

## 2024-12-13 DIAGNOSIS — Z98.890 OTHER SPECIFIED POSTPROCEDURAL STATES: Chronic | ICD-10-CM

## 2024-12-13 PROCEDURE — 99283 EMERGENCY DEPT VISIT LOW MDM: CPT

## 2024-12-13 NOTE — ED ADULT TRIAGE NOTE - PATIENT ON (OXYGEN DELIVERY METHOD)
MSW made a 3rd attempt to contact the patient but she did not answer the phone  GOVIND will send an unable to reach letter to the patient's home  room air

## 2024-12-13 NOTE — ED ADULT TRIAGE NOTE - CHIEF COMPLAINT QUOTE
pt c/o of left ear pain x1 week endorses recent dental work done. pt has been self medicating for possible ear infection with left over abx and typical ointment

## 2024-12-14 RX ORDER — IBUPROFEN 200 MG
600 TABLET ORAL ONCE
Refills: 0 | Status: COMPLETED | OUTPATIENT
Start: 2024-12-14 | End: 2024-12-14

## 2024-12-14 RX ORDER — AMOXICILLIN 250 MG
875 CAPSULE ORAL ONCE
Refills: 0 | Status: DISCONTINUED | OUTPATIENT
Start: 2024-12-14 | End: 2024-12-14

## 2024-12-14 RX ORDER — AMOXICILLIN/POTASSIUM CLAV 250-125 MG
1 TABLET ORAL ONCE
Refills: 0 | Status: COMPLETED | OUTPATIENT
Start: 2024-12-14 | End: 2024-12-14

## 2024-12-14 RX ORDER — OFLOXAXIN 3 MG/ML
10 SOLUTION/ DROPS AURICULAR (OTIC)
Qty: 5 | Refills: 0
Start: 2024-12-14 | End: 2024-12-23

## 2024-12-14 RX ORDER — AMOXICILLIN 250 MG
1 CAPSULE ORAL
Qty: 20 | Refills: 0
Start: 2024-12-14 | End: 2024-12-23

## 2024-12-14 RX ADMIN — Medication 1 TABLET(S): at 00:28

## 2024-12-14 RX ADMIN — Medication 600 MILLIGRAM(S): at 00:16

## 2024-12-14 NOTE — ED PROVIDER NOTE - CLINICAL SUMMARY MEDICAL DECISION MAKING FREE TEXT BOX
HPI patient with history of hypertension and hyperlipidemia.  Patient presents today for evaluation of left ear pain that has been ongoing for 6 to 7 days.  Patient has been applying topical antibiotic to the external ear and canal with no resolution of symptoms.  Patient has had some dental work recently and thought maybe this was associated to the dental work.  No pain in the mouth or inflammation to the maxillary or mandibular area.  Pain with manipulation of the ear.  No fever no chills.  No neck pain no neck rigidity.    On examination patient is alert and oriented and able to provide all history.  No mastoid tenderness.  Left tympanic membrane bulging and erythematous and also erythematous left-sided external canal.  No vesicles visualized.  Normal lung sounds, normal heart sounds.    MDM will treat patient for combined otitis media/otitis externa.  First dose of antibiotics given in the emergency room.  If any worsening or persistent symptoms follow-up with ENT.  Patient is well-appearing otherwise.

## 2024-12-14 NOTE — ED PROVIDER NOTE - CARE PROVIDER_API CALL
Wilmer Encarnacion  Otolaryngology  [address]  [place] [address]  Phone: [phone]  Fax: [phone]  Follow Up Time:

## 2024-12-14 NOTE — ED PROVIDER NOTE - PATIENT PORTAL LINK FT
You can access the FollowMyHealth Patient Portal offered by [facility] by registering at the following website: [email or web address]. By joining [employer]’s FollowMyHealth portal, you will also be able to view your health information using other applications (apps) compatible with our system.

## 2024-12-16 DIAGNOSIS — H92.02 OTALGIA, LEFT EAR: ICD-10-CM

## 2024-12-16 DIAGNOSIS — I10 ESSENTIAL (PRIMARY) HYPERTENSION: ICD-10-CM

## 2024-12-16 DIAGNOSIS — H66.92 OTITIS MEDIA, UNSPECIFIED, LEFT EAR: ICD-10-CM

## 2024-12-16 DIAGNOSIS — E78.5 HYPERLIPIDEMIA, UNSPECIFIED: ICD-10-CM

## 2025-01-09 ENCOUNTER — APPOINTMENT (OUTPATIENT)
Dept: UROLOGY | Facility: CLINIC | Age: 80
End: 2025-01-09
Payer: MEDICARE

## 2025-01-09 ENCOUNTER — NON-APPOINTMENT (OUTPATIENT)
Age: 80
End: 2025-01-09

## 2025-01-09 VITALS
DIASTOLIC BLOOD PRESSURE: 71 MMHG | WEIGHT: 207 LBS | TEMPERATURE: 98.7 F | SYSTOLIC BLOOD PRESSURE: 135 MMHG | OXYGEN SATURATION: 98 % | HEIGHT: 73 IN | BODY MASS INDEX: 27.43 KG/M2 | HEART RATE: 58 BPM

## 2025-01-09 DIAGNOSIS — R31.0 GROSS HEMATURIA: ICD-10-CM

## 2025-01-09 PROCEDURE — 99203 OFFICE O/P NEW LOW 30 MIN: CPT

## 2025-01-09 RX ORDER — TAMSULOSIN HYDROCHLORIDE 0.4 MG/1
0.4 CAPSULE ORAL
Qty: 30 | Refills: 1 | Status: ACTIVE | COMMUNITY
Start: 2025-01-09 | End: 1900-01-01

## 2025-01-09 RX ORDER — AMOXICILLIN AND CLAVULANATE POTASSIUM 875; 125 MG/1; MG/1
875-125 TABLET, COATED ORAL
Qty: 14 | Refills: 0 | Status: ACTIVE | COMMUNITY
Start: 2025-01-09 | End: 1900-01-01

## 2025-01-09 RX ORDER — ATORVASTATIN CALCIUM 10 MG/1
10 TABLET, FILM COATED ORAL
Refills: 0 | Status: ACTIVE | COMMUNITY

## 2025-01-10 ENCOUNTER — RESULT REVIEW (OUTPATIENT)
Age: 80
End: 2025-01-10

## 2025-01-10 LAB
PSA FREE FLD-MCNC: 41 %
PSA FREE SERPL-MCNC: 4.15 NG/ML
PSA SERPL-MCNC: 10.2 NG/ML

## 2025-01-14 ENCOUNTER — NON-APPOINTMENT (OUTPATIENT)
Age: 80
End: 2025-01-14

## 2025-01-14 LAB
APPEARANCE: ABNORMAL
BACTERIA UR CULT: ABNORMAL
BACTERIA: NEGATIVE /HPF
BILIRUBIN URINE: NEGATIVE
BLOOD URINE: ABNORMAL
CAST: 0 /LPF
COLOR: ABNORMAL
EPITHELIAL CELLS: 0 /HPF
GLUCOSE QUALITATIVE U: NEGATIVE MG/DL
KETONES URINE: NEGATIVE MG/DL
LEUKOCYTE ESTERASE URINE: NEGATIVE
MICROSCOPIC-UA: NORMAL
NITRITE URINE: NEGATIVE
PH URINE: 6.5
PROTEIN URINE: NORMAL MG/DL
RED BLOOD CELLS URINE: >1900 /HPF
REVIEW: NORMAL
SPECIFIC GRAVITY URINE: 1.02
UROBILINOGEN URINE: 0.2 MG/DL
WHITE BLOOD CELLS URINE: 7 /HPF

## 2025-01-15 ENCOUNTER — APPOINTMENT (OUTPATIENT)
Dept: CT IMAGING | Facility: CLINIC | Age: 80
End: 2025-01-15
Payer: MEDICARE

## 2025-01-15 ENCOUNTER — OUTPATIENT (OUTPATIENT)
Dept: OUTPATIENT SERVICES | Facility: HOSPITAL | Age: 80
LOS: 1 days | End: 2025-01-15

## 2025-01-15 DIAGNOSIS — Z98.890 OTHER SPECIFIED POSTPROCEDURAL STATES: Chronic | ICD-10-CM

## 2025-01-15 PROCEDURE — 74178 CT ABD&PLV WO CNTR FLWD CNTR: CPT | Mod: 26

## 2025-01-16 ENCOUNTER — NON-APPOINTMENT (OUTPATIENT)
Age: 80
End: 2025-01-16

## 2025-01-16 LAB — URINE CYTOLOGY: NORMAL

## 2025-01-21 ENCOUNTER — TRANSCRIPTION ENCOUNTER (OUTPATIENT)
Age: 80
End: 2025-01-21

## 2025-01-22 ENCOUNTER — TRANSCRIPTION ENCOUNTER (OUTPATIENT)
Age: 80
End: 2025-01-22

## 2025-01-23 ENCOUNTER — TRANSCRIPTION ENCOUNTER (OUTPATIENT)
Age: 80
End: 2025-01-23

## 2025-01-23 ENCOUNTER — APPOINTMENT (OUTPATIENT)
Dept: UROLOGY | Facility: CLINIC | Age: 80
End: 2025-01-23
Payer: MEDICARE

## 2025-01-23 PROCEDURE — 99213 OFFICE O/P EST LOW 20 MIN: CPT

## 2025-01-23 RX ORDER — FINASTERIDE 5 MG/1
5 TABLET, FILM COATED ORAL
Qty: 90 | Refills: 3 | Status: ACTIVE | COMMUNITY
Start: 2025-01-23 | End: 1900-01-01

## 2025-01-24 LAB
APPEARANCE: ABNORMAL
BACTERIA: NEGATIVE /HPF
BILIRUBIN URINE: ABNORMAL
BLOOD URINE: ABNORMAL
CAST: 0 /LPF
COLOR: ABNORMAL
EPITHELIAL CELLS: 0 /HPF
GLUCOSE QUALITATIVE U: NEGATIVE MG/DL
KETONES URINE: NEGATIVE MG/DL
LEUKOCYTE ESTERASE URINE: ABNORMAL
MICROSCOPIC-UA: NORMAL
NITRITE URINE: POSITIVE
PH URINE: 6.5
PROTEIN URINE: 100 MG/DL
RED BLOOD CELLS URINE: >1900 /HPF
REVIEW: NORMAL
SPECIFIC GRAVITY URINE: 1.02
UROBILINOGEN URINE: 0.2 MG/DL
WHITE BLOOD CELLS URINE: 14 /HPF

## 2025-01-27 LAB — BACTERIA UR CULT: NORMAL

## 2025-02-12 NOTE — DISCHARGE NOTE ADULT - NS MD DC FALL RISK RISK
Detail Level: Detailed Detail Level: Generalized Detail Level: Zone For information on Fall & Injury Prevention, visit [email or web address]

## 2025-02-24 ENCOUNTER — RESULT REVIEW (OUTPATIENT)
Age: 80
End: 2025-02-24

## 2025-02-24 ENCOUNTER — OUTPATIENT (OUTPATIENT)
Dept: OUTPATIENT SERVICES | Facility: HOSPITAL | Age: 80
LOS: 1 days | End: 2025-02-24

## 2025-02-24 ENCOUNTER — APPOINTMENT (OUTPATIENT)
Dept: MRI IMAGING | Facility: CLINIC | Age: 80
End: 2025-02-24
Payer: MEDICARE

## 2025-02-24 DIAGNOSIS — Z98.890 OTHER SPECIFIED POSTPROCEDURAL STATES: Chronic | ICD-10-CM

## 2025-02-24 PROCEDURE — 72197 MRI PELVIS W/O & W/DYE: CPT | Mod: 26

## 2025-02-24 PROCEDURE — 76498P: CUSTOM | Mod: 26

## 2025-03-25 ENCOUNTER — APPOINTMENT (OUTPATIENT)
Dept: UROLOGY | Facility: CLINIC | Age: 80
End: 2025-03-25
Payer: MEDICARE

## 2025-03-25 VITALS
HEART RATE: 56 BPM | SYSTOLIC BLOOD PRESSURE: 128 MMHG | DIASTOLIC BLOOD PRESSURE: 66 MMHG | OXYGEN SATURATION: 97 % | HEIGHT: 73 IN | BODY MASS INDEX: 26.77 KG/M2 | TEMPERATURE: 97.2 F | WEIGHT: 202 LBS

## 2025-03-25 PROCEDURE — 99213 OFFICE O/P EST LOW 20 MIN: CPT

## 2025-05-05 NOTE — ED ADULT NURSE NOTE - EXTENSIONS OF SELF_ADULT
Dapsone Pregnancy And Lactation Text: This medication is Pregnancy Category C and is not considered safe during pregnancy or breast feeding. None
